# Patient Record
Sex: MALE | Race: OTHER | NOT HISPANIC OR LATINO
[De-identification: names, ages, dates, MRNs, and addresses within clinical notes are randomized per-mention and may not be internally consistent; named-entity substitution may affect disease eponyms.]

---

## 2017-01-03 ENCOUNTER — TRANSCRIPTION ENCOUNTER (OUTPATIENT)
Age: 47
End: 2017-01-03

## 2017-07-25 ENCOUNTER — TRANSCRIPTION ENCOUNTER (OUTPATIENT)
Age: 47
End: 2017-07-25

## 2017-11-27 ENCOUNTER — EMERGENCY (EMERGENCY)
Facility: HOSPITAL | Age: 47
LOS: 0 days | Discharge: HOME | End: 2017-11-27

## 2017-11-27 DIAGNOSIS — J44.9 CHRONIC OBSTRUCTIVE PULMONARY DISEASE, UNSPECIFIED: ICD-10-CM

## 2017-11-27 DIAGNOSIS — R10.13 EPIGASTRIC PAIN: ICD-10-CM

## 2017-11-27 DIAGNOSIS — R10.31 RIGHT LOWER QUADRANT PAIN: ICD-10-CM

## 2017-11-27 DIAGNOSIS — R11.0 NAUSEA: ICD-10-CM

## 2017-11-27 DIAGNOSIS — Z88.0 ALLERGY STATUS TO PENICILLIN: ICD-10-CM

## 2017-11-27 DIAGNOSIS — R19.7 DIARRHEA, UNSPECIFIED: ICD-10-CM

## 2017-11-27 DIAGNOSIS — R10.32 LEFT LOWER QUADRANT PAIN: ICD-10-CM

## 2017-11-27 DIAGNOSIS — Z87.891 PERSONAL HISTORY OF NICOTINE DEPENDENCE: ICD-10-CM

## 2017-11-27 DIAGNOSIS — E78.5 HYPERLIPIDEMIA, UNSPECIFIED: ICD-10-CM

## 2017-11-27 DIAGNOSIS — Z79.899 OTHER LONG TERM (CURRENT) DRUG THERAPY: ICD-10-CM

## 2017-12-08 ENCOUNTER — EMERGENCY (EMERGENCY)
Facility: HOSPITAL | Age: 47
LOS: 0 days | Discharge: HOME | End: 2017-12-08

## 2017-12-08 DIAGNOSIS — Z79.51 LONG TERM (CURRENT) USE OF INHALED STEROIDS: ICD-10-CM

## 2017-12-08 DIAGNOSIS — Z88.2 ALLERGY STATUS TO SULFONAMIDES: ICD-10-CM

## 2017-12-08 DIAGNOSIS — M54.2 CERVICALGIA: ICD-10-CM

## 2017-12-08 DIAGNOSIS — R05 COUGH: ICD-10-CM

## 2017-12-08 DIAGNOSIS — Z79.899 OTHER LONG TERM (CURRENT) DRUG THERAPY: ICD-10-CM

## 2017-12-08 DIAGNOSIS — Z88.0 ALLERGY STATUS TO PENICILLIN: ICD-10-CM

## 2017-12-08 DIAGNOSIS — R59.0 LOCALIZED ENLARGED LYMPH NODES: ICD-10-CM

## 2017-12-08 DIAGNOSIS — J44.9 CHRONIC OBSTRUCTIVE PULMONARY DISEASE, UNSPECIFIED: ICD-10-CM

## 2019-04-12 ENCOUNTER — TRANSCRIPTION ENCOUNTER (OUTPATIENT)
Age: 49
End: 2019-04-12

## 2020-10-12 ENCOUNTER — TRANSCRIPTION ENCOUNTER (OUTPATIENT)
Age: 50
End: 2020-10-12

## 2023-08-13 ENCOUNTER — NON-APPOINTMENT (OUTPATIENT)
Age: 53
End: 2023-08-13

## 2023-08-15 PROBLEM — Z00.00 ENCOUNTER FOR PREVENTIVE HEALTH EXAMINATION: Status: ACTIVE | Noted: 2023-08-15

## 2023-08-16 ENCOUNTER — APPOINTMENT (OUTPATIENT)
Dept: PSYCHIATRY | Facility: CLINIC | Age: 53
End: 2023-08-16

## 2023-08-16 ENCOUNTER — OUTPATIENT (OUTPATIENT)
Dept: OUTPATIENT SERVICES | Facility: HOSPITAL | Age: 53
LOS: 1 days | End: 2023-08-16
Payer: COMMERCIAL

## 2023-08-16 DIAGNOSIS — F43.10 POST-TRAUMATIC STRESS DISORDER, UNSPECIFIED: ICD-10-CM

## 2023-08-16 PROCEDURE — 90791 PSYCH DIAGNOSTIC EVALUATION: CPT

## 2023-08-17 DIAGNOSIS — F43.10 POST-TRAUMATIC STRESS DISORDER, UNSPECIFIED: ICD-10-CM

## 2023-08-18 NOTE — PHYSICAL EXAM
[Cooperative] : cooperative [Euthymic] : euthymic [Full] : full [Clear] : clear [Linear/Goal Directed] : linear/goal directed [None] : none [None Reported] : none reported [Average] : average [WNL] : within normal limits [Individual reports tobacco use during the last 30 days?] : Individual reports tobacco use during the last 30 days? Yes [Individual reports use of the following tobacco products during the last 30 days?] : Individual reports use of the following tobacco products during the last 30 days? Yes -  [Cigarettes] : Cigarettes [Was tobacco cessation medication and/or nicotine replacement therapy recommended?] : Was tobacco cessation medication and/or nicotine replacement therapy recommended? Yes [Does individual accept referral to MD for cessation medication or NRT?] : Does individual accept referral to MD for cessation medication or NRT? Yes [Individual reports current use of tobacco cessation medication or nicotine replacement therapy?] : Individual reports current use of tobacco cessation medication or nicotine replacement therapy? No

## 2023-08-18 NOTE — HEALTH RISK ASSESSMENT
[Patient/Caregiver unclear of wishes] : , patient/caregiver unclear of wishes [I will adhere to the patient's wishes.] : I will adhere to the patient's wishes. [Time Spent: ___ minutes] : Time Spent: [unfilled] minutes [With Patient/Collateral] : , with patient/collateral [AdvancecareDate] : 08/16/2023 [] : 08/16/2023
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
## 2023-08-18 NOTE — RISK ASSESSMENT
[Clinical Interview] : Clinical Interview [No known suicide factors] : No known suicide factors [Depressed mood/Anhedonia] : depressed mood/anhedonia [Global insomnia] : global insomnia [Severe anxiety, agitation or panic] : severe anxiety, agitation or panic [None known] : None known [Identifies reasons for living] : identifies reasons for living [Supportive social network of family or friends] : supportive social network of family or friends [Cultural, spiritual and/or moral attitudes against suicide] : cultural, spiritual and/or moral attitudes against suicide [Ability to cope with stress] : ability to cope with stress [Positive therapeutic relationships] : positive therapeutic relationships [Responsibility to children, family, or others] : responsibility to children, family, or others [None in the patient's lifetime] : None in the patient's lifetime [None Known] : none known [No known risk factors] : No known risk factors [Residential stability] : residential stability [Relationship stability] : relationship stability [Affective stability] : affective stability [Sobriety] : sobriety [Insight into violence risk and need for management/treatment] : insight into violence risk and need for management/treatment [Engagement in treatment] : engagement in treatment [No] : no [FreeTextEntry6] : The patient denied feeling as if he were a threat to himself

## 2023-08-18 NOTE — FAMILY HISTORY
[FreeTextEntry1] : Family composition: Children, father and grandchild  Family history and background:  male of  decent Family relationship: Patient notes that he has a good relationship with his family  Pertinent Family Medical, MH and Substance Use History including Adult Child of Alcoholic and child of substance abuse status; history of cancer and heart disease: Patient notes that he has been sober for 10 years from alcohol use disorder. Patient notes that he has a family history of heart disease. Mother passed away from a heart attack approximately 19 years ago.
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
## 2023-08-18 NOTE — SOCIAL HISTORY
[FreeTextEntry1] : Legal Status  Does individual Served have a Legal Guardian, rep Payee or Conservatorship? [ X] No [ ] Yes - Details: [ ]   Legal Involvement history  Does the individual have a history of or current involvement with the legal system?  [X ] No [ ] Yes - Details: [ ]    Services  Have you ever served in the ?  [ ] No [X ] Yes - Details: The patient was in the [employer]. Patient notes that he was [place]. Patient was in the  from [id number]. Patient is not connected to the [facility] and denies wanting services from the VA   Employment history: The patient was employed with the [employer] for 8 years and is currently retired and is not currently seeking out employment opportunities.   Developmental history: N/A   Sexual hx/identity Sexual History/ Concern (include sexual orientation and other relevant information): Heterosexual male utilizing he/him/his pronouns.   Race - ethnicity - culture information:  male of  decent [place] on [place]  Social supports (friends, Volunteers, club, AA meeting, other meetings ) ? "My friend that I live with is my social support and also my children"   Meaningful Activities: "I like to walk and exercise"   Spiritual Assessment Tool - FICA F. What is your aniket or belief? Episcopalian  Do you consider yourself spiritual or Rastafari? Worship  Is there something you believe in that gives meaning to your life? "I am thankful for everyday that I wake up" I: Is it important in your life? Yes  What influence does it have on how you take care of yourself? "No" How have your beliefs influenced your behavior during this illness? What role do your beliefs play in regaining your health? "None"  C. Are you part of a spiritual or Rastafari community? "No"  Is this of support to you and how? N/A  Is there a person or group of people you really love or who are really important to you? "No" H. We have been discussing your belief and supports. What else gives you internal support? "My family"  What are your sources of hope, strength, comfort and peace? " Hopefully for tomorrow and the hope in other people" What do you hold on to during difficult times? "My children"  what sustains you and keeps you going? "My children"  A. How would you like me, your healthcare provider, to address these issues in your healthcare? "Address with me"   SMOKING CESSATION  Do you Smoke?                              [X ] Yes		[ ] No Do you want to quit? 		[X ] Yes		[ ] No -ASK 	Number of cigarettes 5  [ ] cigars [ ]  pipe bowls [ ]  per day- **Patient notes that he enjoys lighting cigarettes as a form of "meditation" notes that he lights up approximately 1 pack per day.  	Number of ST cans/ pouches per week [1 ] 	Number of years used [40 years ] 	How soon after you wake up do you use tobacco?  [ X] within 30 minutes      [ ] more than 30 minutes  	Previous quit attempts:  # of attempts 1[ ] longest quit period [ 4-5 days] methods(s) used [ "I just stopped smoking"  ] How long ago was last attempt to quit  [ ] years [6 ] months  	Reasons for wanting to quit[ ] -ADVISE   about the oral benefits of quitting -ASSESS   willingness to make a quit attempt (Stage of Change)  	    [ ] Precontemplation (Stop here & Reassess next visit)	[ ] Contemplation [ ] Preparation   -ASSIST    (depending on stage of change)  	Self-Help Pamphlets & Materials 	List of local community group/individual quit programs and phone help lines 	Encourage a quit date (for those who are ready) 	Pharmacotherapy: Nicotine gum/ Lozenge/ Patch/ Inhaler/NS/Zyban/ Chantix  	RX: 	[ ]  ()  -ARRANGE  Follow up if set a quit date (with permission) Quit Date: [ ]  Phone calls or visits:  [ ] Week 1-2  Months   [ ] 1   [ ] 3   [ ] 6   [ ] 12   -Yearly Reassessment    [ ] No Changes of Smoking [ ] Change of Smoking Habit (Non-Smoker to Smoker/ Smoker to Non Smoker) (If there is change from Non Smoker to Smoker, please fill out new BRIEF TOBACCO CESSATION INTERVENTION FORM)  Date of Yearly Reassessment : [ ] Comments:  [ ]
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
## 2023-08-18 NOTE — PSYCHOSOCIAL ASSESSMENT
[Yes, during lifetime] : Yes, during lifetime [All substances negative except as specified below] : All substances negative except as specified below [_____] : Last Use: [unfilled]  [HS Diploma or GED] : HS Diploma or GED [Yes (select details below)] : Have you ever experienced this type of event? Yes [had nightmares about the event(s) or thought about the event(s) when you did not want] : had nightmares and/or unwanted thoughts about the events [tried hard not to think about the event(s) or went out of your way to avoid situations that reminded you of the event] : tried hard to avoid thinking about events or avoid situations that reminded patient of the event [has been constantly on guard, watchful, or easily startled] : has been constantly on guard, watchful, or easily startled [felt numb or detached from people, activities, or your surrounding] : has felt numb or detached from people, activities, or surroundings [felt guilty or unable to stop blaming yourself or others for the event(s) or any problems the event(s) may have caused] : has felt guilty or unable to stop blaming self or others for event(s), or any problems the event(s) may have caused [Not applicable] : not applicable [Retired] : retired [Financially stable] : financially stable [SSD] : SSD [Private residence (home, apartment, rooming house, hotel, motel, supported housing, supported Single Room Occupancy (SRO),] : Private residence (home, apartment, rooming house, hotel, motel, supported housing, supported Single Room Occupancy (SRO), permanent housing programs, transient housing programs, and shelter plus care housing) [Client's spouse or domestic partner] : client's spouse or domestic partner [Yes] : yes [N/A] : n/a [Spouse/Partner] : spouse/partner [Good] : good [Lives with Family Member] : lives with family member [Yes (add details)] : Prior or current active [place]  service? Yes [Yes - Relationship: ____] : Yes - [unfilled] [No] : Patient has personal representation (legal guardian, representative payee, conservatorship)? No [FreeTextEntry2] : Patient notes that he has a girlfriend that he would consider as his significant other, Christina.  [FreeTextEntry3] : The patient notes that he keeps to himself and functions well.  [FreeTextEntry1] : The patient identifies as a  cisgnder male and is a Oriental orthodox.  [FreeTextEntry7] : Christina Salmon [phone]

## 2023-08-30 ENCOUNTER — APPOINTMENT (OUTPATIENT)
Dept: PSYCHIATRY | Facility: CLINIC | Age: 53
End: 2023-08-30

## 2023-08-30 ENCOUNTER — NON-APPOINTMENT (OUTPATIENT)
Age: 53
End: 2023-08-30

## 2023-09-05 ENCOUNTER — APPOINTMENT (OUTPATIENT)
Dept: PSYCHIATRY | Facility: CLINIC | Age: 53
End: 2023-09-05
Payer: COMMERCIAL

## 2023-09-05 ENCOUNTER — OUTPATIENT (OUTPATIENT)
Dept: OUTPATIENT SERVICES | Facility: HOSPITAL | Age: 53
LOS: 1 days | End: 2023-09-05
Payer: COMMERCIAL

## 2023-09-05 ENCOUNTER — APPOINTMENT (OUTPATIENT)
Dept: PSYCHIATRY | Facility: CLINIC | Age: 53
End: 2023-09-05

## 2023-09-05 DIAGNOSIS — F43.10 POST-TRAUMATIC STRESS DISORDER, UNSPECIFIED: ICD-10-CM

## 2023-09-05 PROCEDURE — 90792 PSYCH DIAG EVAL W/MED SRVCS: CPT | Mod: 95

## 2023-09-05 PROCEDURE — 90792 PSYCH DIAG EVAL W/MED SRVCS: CPT

## 2023-09-06 ENCOUNTER — APPOINTMENT (OUTPATIENT)
Dept: PSYCHIATRY | Facility: CLINIC | Age: 53
End: 2023-09-06

## 2023-09-06 ENCOUNTER — OUTPATIENT (OUTPATIENT)
Dept: OUTPATIENT SERVICES | Facility: HOSPITAL | Age: 53
LOS: 1 days | End: 2023-09-06
Payer: COMMERCIAL

## 2023-09-06 DIAGNOSIS — F33.1 MAJOR DEPRESSIVE DISORDER, RECURRENT, MODERATE: ICD-10-CM

## 2023-09-06 DIAGNOSIS — F43.10 POST-TRAUMATIC STRESS DISORDER, UNSPECIFIED: ICD-10-CM

## 2023-09-06 PROCEDURE — 90834 PSYTX W PT 45 MINUTES: CPT
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
## 2023-09-06 NOTE — SOCIAL HISTORY
[FreeTextEntry1] : Legal Status  Does individual Served have a Legal Guardian, rep Payee or Conservatorship? [ X] No [ ] Yes - Details: [ ]   Legal Involvement history  Does the individual have a history of or current involvement with the legal system?  [X ] No [ ] Yes - Details: [ ]    Services  Have you ever served in the ?  [ ] No [X ] Yes - Details: The patient was in the [employer]. Patient notes that he was [place]. Patient was in the  from [id number]. Patient is not connected to the VA and denies wanting services from the VA   Employment history: The patient was employed with the [employer] for 8 years and is currently retired and is not currently seeking out employment opportunities.   Developmental history: N/A   Sexual hx/identity Sexual History/ Concern (include sexual orientation and other relevant information): Heterosexual male utilizing he/him/his pronouns.   Race - ethnicity - culture information:  male of  decent growing up on [place]  Social supports (friends, Volunteers, club, AA meeting, other meetings ) ? "My friend that I live with is my social support and also my children"   Meaningful Activities: "I like to walk and exercise"   Spiritual Assessment Tool - FICA F. What is your aniket or belief? Voodoo  Do you consider yourself spiritual or Oriental orthodox? Congregational  Is there something you believe in that gives meaning to your life? "I am thankful for everyday that I wake up" I: Is it important in your life? Yes  What influence does it have on how you take care of yourself? "No" How have your beliefs influenced your behavior during this illness? What role do your beliefs play in regaining your health? "None"  C. Are you part of a spiritual or Oriental orthodox community? "No"  Is this of support to you and how? N/A  Is there a person or group of people you really love or who are really important to you? "No" H. We have been discussing your belief and supports. What else gives you internal support? "My family"  What are your sources of hope, strength, comfort and peace? " Hopefully for tomorrow and the hope in other people" What do you hold on to during difficult times? "My children"  what sustains you and keeps you going? "My children"  A. How would you like me, your healthcare provider, to address these issues in your healthcare? "Address with me"   SMOKING CESSATION  Do you Smoke?                              [X ] Yes		[ ] No Do you want to quit? 		[X ] Yes		[ ] No -ASK 	Number of cigarettes 5  [ ] cigars [ ]  pipe bowls [ ]  per day- **Patient notes that he enjoys lighting cigarettes as a form of "meditation" notes that he lights up approximately 1 pack per day.  	Number of ST cans/ pouches per week [1 ] 	Number of years used [40 years ] 	How soon after you wake up do you use tobacco?  [ X] within 30 minutes      [ ] more than 30 minutes  	Previous quit attempts:  # of attempts 1[ ] longest quit period [ 4-5 days] methods(s) used [ "I just stopped smoking"  ] How long ago was last attempt to quit  [ ] years [6 ] months  	Reasons for wanting to quit[ ] -ADVISE   about the oral benefits of quitting -ASSESS   willingness to make a quit attempt (Stage of Change)  	    [ ] Precontemplation (Stop here & Reassess next visit)	[ ] Contemplation [ ] Preparation   -ASSIST    (depending on stage of change)  	Self-Help Pamphlets & Materials 	List of local community group/individual quit programs and phone help lines 	Encourage a quit date (for those who are ready) 	Pharmacotherapy: Nicotine gum/ Lozenge/ Patch/ Inhaler/NS/Zyban/ Chantix  	RX: 	[ ]  ()  -ARRANGE  Follow up if set a quit date (with permission) Quit Date: [ ]  Phone calls or visits:  [ ] Week 1-2  Months   [ ] 1   [ ] 3   [ ] 6   [ ] 12   -Yearly Reassessment    [ ] No Changes of Smoking [ ] Change of Smoking Habit (Non-Smoker to Smoker/ Smoker to Non Smoker) (If there is change from Non Smoker to Smoker, please fill out new BRIEF TOBACCO CESSATION INTERVENTION FORM)  Date of Yearly Reassessment : [ ] Comments:  [ ]
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
## 2023-09-06 NOTE — END OF VISIT
[Duration of Psychotherapy Visit (minutes spent in synchronous communication): ____] : Duration of Psychotherapy Visit (minutes spent in synchronous communication): [unfilled] [Individual Psychotherapy for 38-52 minutes] : Individual Psychotherapy for 38 - 52 minutes [FreeTextEntry3] : [address]  [FreeTextEntry5] : [address] [place]  [Licensed Clinician] : Licensed Clinician

## 2023-09-06 NOTE — REASON FOR VISIT
[FreeTextEntry4] : 1:30pm  [FreeTextEntry5] : 2:15pm  [Patient] : Patient [FreeTextEntry1] : psychotherapy follow up

## 2023-09-06 NOTE — HEALTH RISK ASSESSMENT
[Patient/Caregiver unclear of wishes] : , patient/caregiver unclear of wishes [I will adhere to the patient's wishes.] : I will adhere to the patient's wishes. [Time Spent: ___ minutes] : Time Spent: [unfilled] minutes [With Patient/Collateral] : , with patient/collateral [AdvancecareDate] : 08/16/2023
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
## 2023-09-06 NOTE — PSYCHOSOCIAL ASSESSMENT
[Yes, during lifetime] : Yes, during lifetime [All substances negative except as specified below] : All substances negative except as specified below [_____] : Last Use: [unfilled]  [HS Diploma or GED] : HS Diploma or GED [Yes (select details below)] : Have you ever experienced this type of event? Yes [had nightmares about the event(s) or thought about the event(s) when you did not want] : had nightmares and/or unwanted thoughts about the events [tried hard not to think about the event(s) or went out of your way to avoid situations that reminded you of the event] : tried hard to avoid thinking about events or avoid situations that reminded patient of the event [has been constantly on guard, watchful, or easily startled] : has been constantly on guard, watchful, or easily startled [felt numb or detached from people, activities, or your surrounding] : has felt numb or detached from people, activities, or surroundings [felt guilty or unable to stop blaming yourself or others for the event(s) or any problems the event(s) may have caused] : has felt guilty or unable to stop blaming self or others for event(s), or any problems the event(s) may have caused [Not applicable] : not applicable [Retired] : retired [Financially stable] : financially stable [SSD] : SSD [Private residence (home, apartment, rooming house, hotel, motel, supported housing, supported Single Room Occupancy (SRO),] : Private residence (home, apartment, rooming house, hotel, motel, supported housing, supported Single Room Occupancy (SRO), permanent housing programs, transient housing programs, and shelter plus care housing) [Client's spouse or domestic partner] : client's spouse or domestic partner [Yes] : yes [N/A] : n/a [Spouse/Partner] : spouse/partner [Good] : good [Lives with Family Member] : lives with family member [Yes (add details)] : Prior or current active [place]  service? Yes [Yes - Relationship: ____] : Yes - [unfilled] [No] : Patient has personal representation (legal guardian, representative payee, conservatorship)? No [FreeTextEntry2] : Patient notes that he has a girlfriend that he would consider as his significant other, Christina.  [FreeTextEntry3] : The patient notes that he keeps to himself and functions well.  [FreeTextEntry1] : The patient identifies as a  cisgnder male and is a Oriental orthodox.  [FreeTextEntry7] : Christina Salmon [phone]
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
## 2023-09-06 NOTE — END OF VISIT
[Duration of Psychotherapy Visit (minutes spent in synchronous communication): ____] : Duration of Psychotherapy Visit (minutes spent in synchronous communication): [unfilled] [Individual Psychotherapy for 38-52 minutes] : Individual Psychotherapy for 38 - 52 minutes [FreeTextEntry3] : [address]  [FreeTextEntry5] : [address]  [Licensed Clinician] : Licensed Clinician

## 2023-09-06 NOTE — PLAN
[FreeTextEntry4] : Pt appears to meet criteria for PTSD with symptoms of flashbacks, nightmares, hypervigilance. Will start Zoloft to aid with PTSD symptoms. Pt agrees with plan  PLAN 1) START Zoloft 25mg x weeks, then increase to 50mgqdaily 2) Continue therapy 3) Follow up in 1 month

## 2023-09-07 ENCOUNTER — APPOINTMENT (OUTPATIENT)
Dept: PSYCHIATRY | Facility: CLINIC | Age: 53
End: 2023-09-07

## 2023-09-07 ENCOUNTER — NON-APPOINTMENT (OUTPATIENT)
Age: 53
End: 2023-09-07

## 2023-09-07 ENCOUNTER — OUTPATIENT (OUTPATIENT)
Dept: OUTPATIENT SERVICES | Facility: HOSPITAL | Age: 53
LOS: 1 days | End: 2023-09-07

## 2023-09-07 DIAGNOSIS — F43.10 POST-TRAUMATIC STRESS DISORDER, UNSPECIFIED: ICD-10-CM

## 2023-09-07 DIAGNOSIS — F33.1 MAJOR DEPRESSIVE DISORDER, RECURRENT, MODERATE: ICD-10-CM

## 2023-09-07 NOTE — PHYSICAL EXAM
[Individual reports tobacco use during the last 30 days?] : Individual reports tobacco use during the last 30 days? Yes [Individual reports use of the following tobacco products during the last 30 days?] : Individual reports use of the following tobacco products during the last 30 days? Yes -  [Cigarettes] : Cigarettes [Was tobacco cessation medication and/or nicotine replacement therapy recommended?] : Was tobacco cessation medication and/or nicotine replacement therapy recommended? Yes [Does individual accept referral to MD for cessation medication or NRT?] : Does individual accept referral to MD for cessation medication or NRT? Yes [Individual reports current use of tobacco cessation medication or nicotine replacement therapy?] : Individual reports current use of tobacco cessation medication or nicotine replacement therapy? No

## 2023-09-07 NOTE — DISCUSSION/SUMMARY
[Initial Plan] : Initial Plan [Mental Health] : Mental Health [Able to manage surrounding demands and opportunities] : able to manage surrounding demands and opportunities [Able to exercise self-direction] : able to exercise self-direction [Able to set and pursue goals] : able to set and pursue goals [Adherent to treatment recommendations] : adherent to treatment recommendations [Articulate] : articulate [Attempting to realize their potential] : Attempting to realize their potential [Cognitively intact] : cognitively intact [Insightful] : insightful [Creative] : creative [Intelligent] : intelligent [Motivated to participate in treatment] : motivated to participate in treatment [Motivated and ready for change] : motivated and ready for change [Health literacy] : health literacy [Motivated to maintain or improve physical health] : motivated to maintain or improve physical health [In good health] : in good health [Financially stable] : financially stable [Has vocational interests or hobbies] : has vocational interests or hobbies [Part of a supportive family] : part of a supportive family [Housing stability] : housing stability [English fluency] : English fluency [Connected to healthcare] : connected to healthcare [Access to safe outdoor spaces] : access to safe outdoor spaces [FreeTextEntry2] : 09/06/2024 [FreeTextEntry3] : 09/05/2023 [FreeTextEntry8] : N/A  [FreeTextEntry9] : N/A [de-identified] : N/A [Physical Health] : Physical Health [Initial] : Initial [every ___ months] : every [unfilled] months [every ___ weeks] : every [unfilled] weeks [Improvement in symptoms as evidenced by:] : Improvement in symptoms as evidenced by: [Attainment of higher level of functioning as evidenced by:] : Attainment of higher level of functioning as evidenced by: [None - Reason others did not participate:] : None - Reason others did not participate:  [Yes] : Yes [Psychiatric Provider/Prescriber] : Psychiatric Provider/Prescriber [Therapist] : Therapist [Supervisor (if needed)] : Supervisor [FreeTextEntry1] : Management of PTSD related symptoms  [de-identified] : The patient will attend scheduled medication management appointments and will adhere to psychotropic medication as prescribed  [de-identified] : 9/6/2024  [FreeTextEntry5] : The writer will utilize CBT, DBT and supportive therapy  [FreeTextEntry4] : "I want to continue on with the treatment that I have had prior to this with my previous therapist"  [de-identified] : The patient will attend scheduled medical appointments and will adhere to prescribed medications  [de-identified] : 9/6/2024 [de-identified] : The patient is seen by Dr. Tomasa Melo  [de-identified] : The patient is seen by Shyanne Ortiz LMSW [de-identified] :  The patient expresses improvement in performing activities of daily living and/or says progress with initial complaint symptoms. In addition, the treatment team will conduct diagnose-based screenings as needed. [de-identified] : The patient expresses improvement in performing activities of daily living and/or says progress with initial complaint symptoms. In addition, the treatment team will conduct diagnose-based screenings as needed. [de-identified] : not clinically indicated  [Tobacco Screening Completed?] : Tobacco Screening Completed: Yes [Potential impact of patient’s physical health conditions on psychiatric care?] : Potential impact of patient’s physical health conditions on psychiatric care: Yes [Does patient require any additional health services or referrals?] : Does patient require any additional health services or referrals: No

## 2023-09-07 NOTE — DISCUSSION/SUMMARY
[Initial Plan] : Initial Plan [Mental Health] : Mental Health [Able to manage surrounding demands and opportunities] : able to manage surrounding demands and opportunities [Able to exercise self-direction] : able to exercise self-direction [Able to set and pursue goals] : able to set and pursue goals [Adherent to treatment recommendations] : adherent to treatment recommendations [Articulate] : articulate [Attempting to realize their potential] : Attempting to realize their potential [Cognitively intact] : cognitively intact [Insightful] : insightful [Creative] : creative [Intelligent] : intelligent [Motivated to participate in treatment] : motivated to participate in treatment [Motivated and ready for change] : motivated and ready for change [Health literacy] : health literacy [Motivated to maintain or improve physical health] : motivated to maintain or improve physical health [In good health] : in good health [Financially stable] : financially stable [Has vocational interests or hobbies] : has vocational interests or hobbies [Part of a supportive family] : part of a supportive family [Housing stability] : housing stability [English fluency] : English fluency [Connected to healthcare] : connected to healthcare [Access to safe outdoor spaces] : access to safe outdoor spaces [FreeTextEntry2] : 09/06/2024 [FreeTextEntry3] : 09/05/2023 [FreeTextEntry8] : N/A  [FreeTextEntry9] : N/A [de-identified] : N/A [Physical Health] : Physical Health [Initial] : Initial [every ___ months] : every [unfilled] months [every ___ weeks] : every [unfilled] weeks [Improvement in symptoms as evidenced by:] : Improvement in symptoms as evidenced by: [Attainment of higher level of functioning as evidenced by:] : Attainment of higher level of functioning as evidenced by: [None - Reason others did not participate:] : None - Reason others did not participate:  [Yes] : Yes [Psychiatric Provider/Prescriber] : Psychiatric Provider/Prescriber [Therapist] : Therapist [Supervisor (if needed)] : Supervisor [FreeTextEntry1] : Management of PTSD related symptoms  [de-identified] : The patient will attend scheduled medication management appointments and will adhere to psychotropic medication as prescribed  [de-identified] : 9/6/2024  [FreeTextEntry5] : The writer will utilize CBT, DBT and supportive therapy  [FreeTextEntry4] : "I want to continue on with the treatment that I have had prior to this with my previous therapist"  [de-identified] : The patient will attend scheduled medical appointments and will adhere to prescribed medications  [de-identified] : 9/6/2024 [de-identified] : The patient is seen by Dr. Tomasa Melo  [de-identified] : The patient is seen by Shyanne Ortiz LMSW [de-identified] :  The patient expresses improvement in performing activities of daily living and/or says progress with initial complaint symptoms. In addition, the treatment team will conduct diagnose-based screenings as needed. [de-identified] : The patient expresses improvement in performing activities of daily living and/or says progress with initial complaint symptoms. In addition, the treatment team will conduct diagnose-based screenings as needed. [de-identified] : not clinically indicated  [Tobacco Screening Completed?] : Tobacco Screening Completed: Yes [Potential impact of patient’s physical health conditions on psychiatric care?] : Potential impact of patient’s physical health conditions on psychiatric care: Yes [Does patient require any additional health services or referrals?] : Does patient require any additional health services or referrals: No

## 2023-09-08 DIAGNOSIS — F43.10 POST-TRAUMATIC STRESS DISORDER, UNSPECIFIED: ICD-10-CM

## 2023-09-13 ENCOUNTER — APPOINTMENT (OUTPATIENT)
Dept: PSYCHIATRY | Facility: CLINIC | Age: 53
End: 2023-09-13

## 2023-09-13 ENCOUNTER — OUTPATIENT (OUTPATIENT)
Dept: OUTPATIENT SERVICES | Facility: HOSPITAL | Age: 53
LOS: 1 days | End: 2023-09-13
Payer: COMMERCIAL

## 2023-09-13 VITALS
RESPIRATION RATE: 18 BRPM | WEIGHT: 178 LBS | TEMPERATURE: 98.6 F | BODY MASS INDEX: 25.48 KG/M2 | HEIGHT: 70 IN | HEART RATE: 80 BPM | SYSTOLIC BLOOD PRESSURE: 118 MMHG | DIASTOLIC BLOOD PRESSURE: 76 MMHG

## 2023-09-13 DIAGNOSIS — F33.1 MAJOR DEPRESSIVE DISORDER, RECURRENT, MODERATE: ICD-10-CM

## 2023-09-13 DIAGNOSIS — Z86.39 PERSONAL HISTORY OF OTHER ENDOCRINE, NUTRITIONAL AND METABOLIC DISEASE: ICD-10-CM

## 2023-09-13 PROCEDURE — 99401 PREV MED CNSL INDIV APPRX 15: CPT

## 2023-09-13 PROCEDURE — 90834 PSYTX W PT 45 MINUTES: CPT | Mod: 27

## 2023-09-14 DIAGNOSIS — F33.1 MAJOR DEPRESSIVE DISORDER, RECURRENT, MODERATE: ICD-10-CM

## 2023-09-27 ENCOUNTER — APPOINTMENT (OUTPATIENT)
Dept: PSYCHIATRY | Facility: CLINIC | Age: 53
End: 2023-09-27

## 2023-09-27 ENCOUNTER — OUTPATIENT (OUTPATIENT)
Dept: OUTPATIENT SERVICES | Facility: HOSPITAL | Age: 53
LOS: 1 days | End: 2023-09-27

## 2023-09-27 DIAGNOSIS — F33.1 MAJOR DEPRESSIVE DISORDER, RECURRENT, MODERATE: ICD-10-CM

## 2023-09-28 DIAGNOSIS — F33.1 MAJOR DEPRESSIVE DISORDER, RECURRENT, MODERATE: ICD-10-CM

## 2023-10-03 ENCOUNTER — APPOINTMENT (OUTPATIENT)
Dept: PSYCHIATRY | Facility: CLINIC | Age: 53
End: 2023-10-03
Payer: COMMERCIAL

## 2023-10-03 ENCOUNTER — OUTPATIENT (OUTPATIENT)
Dept: OUTPATIENT SERVICES | Facility: HOSPITAL | Age: 53
LOS: 1 days | End: 2023-10-03
Payer: COMMERCIAL

## 2023-10-03 DIAGNOSIS — F43.10 POST-TRAUMATIC STRESS DISORDER, UNSPECIFIED: ICD-10-CM

## 2023-10-03 PROCEDURE — 99214 OFFICE O/P EST MOD 30 MIN: CPT | Mod: 95

## 2023-10-04 ENCOUNTER — OUTPATIENT (OUTPATIENT)
Dept: OUTPATIENT SERVICES | Facility: HOSPITAL | Age: 53
LOS: 1 days | End: 2023-10-04
Payer: COMMERCIAL

## 2023-10-04 ENCOUNTER — APPOINTMENT (OUTPATIENT)
Dept: PSYCHIATRY | Facility: CLINIC | Age: 53
End: 2023-10-04

## 2023-10-04 DIAGNOSIS — F43.10 POST-TRAUMATIC STRESS DISORDER, UNSPECIFIED: ICD-10-CM

## 2023-10-04 DIAGNOSIS — F33.1 MAJOR DEPRESSIVE DISORDER, RECURRENT, MODERATE: ICD-10-CM

## 2023-10-04 PROCEDURE — 90832 PSYTX W PT 30 MINUTES: CPT

## 2023-10-05 DIAGNOSIS — F43.10 POST-TRAUMATIC STRESS DISORDER, UNSPECIFIED: ICD-10-CM

## 2023-10-11 ENCOUNTER — OUTPATIENT (OUTPATIENT)
Dept: OUTPATIENT SERVICES | Facility: HOSPITAL | Age: 53
LOS: 1 days | End: 2023-10-11
Payer: COMMERCIAL

## 2023-10-11 ENCOUNTER — APPOINTMENT (OUTPATIENT)
Dept: PSYCHIATRY | Facility: CLINIC | Age: 53
End: 2023-10-11

## 2023-10-11 DIAGNOSIS — F33.1 MAJOR DEPRESSIVE DISORDER, RECURRENT, MODERATE: ICD-10-CM

## 2023-10-11 DIAGNOSIS — F43.10 POST-TRAUMATIC STRESS DISORDER, UNSPECIFIED: ICD-10-CM

## 2023-10-11 PROCEDURE — 90832 PSYTX W PT 30 MINUTES: CPT

## 2023-10-12 DIAGNOSIS — F43.10 POST-TRAUMATIC STRESS DISORDER, UNSPECIFIED: ICD-10-CM

## 2023-10-18 ENCOUNTER — APPOINTMENT (OUTPATIENT)
Dept: PSYCHIATRY | Facility: CLINIC | Age: 53
End: 2023-10-18

## 2023-10-18 ENCOUNTER — OUTPATIENT (OUTPATIENT)
Dept: OUTPATIENT SERVICES | Facility: HOSPITAL | Age: 53
LOS: 1 days | End: 2023-10-18
Payer: COMMERCIAL

## 2023-10-18 DIAGNOSIS — F43.10 POST-TRAUMATIC STRESS DISORDER, UNSPECIFIED: ICD-10-CM

## 2023-10-18 DIAGNOSIS — F33.1 MAJOR DEPRESSIVE DISORDER, RECURRENT, MODERATE: ICD-10-CM

## 2023-10-18 PROCEDURE — 90832 PSYTX W PT 30 MINUTES: CPT

## 2023-10-19 DIAGNOSIS — F43.10 POST-TRAUMATIC STRESS DISORDER, UNSPECIFIED: ICD-10-CM

## 2023-10-23 ENCOUNTER — NON-APPOINTMENT (OUTPATIENT)
Age: 53
End: 2023-10-23

## 2023-11-07 ENCOUNTER — APPOINTMENT (OUTPATIENT)
Dept: PSYCHIATRY | Facility: CLINIC | Age: 53
End: 2023-11-07
Payer: COMMERCIAL

## 2023-11-07 ENCOUNTER — OUTPATIENT (OUTPATIENT)
Dept: OUTPATIENT SERVICES | Facility: HOSPITAL | Age: 53
LOS: 1 days | End: 2023-11-07
Payer: COMMERCIAL

## 2023-11-07 DIAGNOSIS — F43.10 POST-TRAUMATIC STRESS DISORDER, UNSPECIFIED: ICD-10-CM

## 2023-11-07 PROCEDURE — 99214 OFFICE O/P EST MOD 30 MIN: CPT | Mod: 95

## 2023-11-08 ENCOUNTER — APPOINTMENT (OUTPATIENT)
Dept: PSYCHIATRY | Facility: CLINIC | Age: 53
End: 2023-11-08

## 2023-11-08 ENCOUNTER — OUTPATIENT (OUTPATIENT)
Dept: OUTPATIENT SERVICES | Facility: HOSPITAL | Age: 53
LOS: 1 days | End: 2023-11-08
Payer: COMMERCIAL

## 2023-11-08 DIAGNOSIS — F43.10 POST-TRAUMATIC STRESS DISORDER, UNSPECIFIED: ICD-10-CM

## 2023-11-08 DIAGNOSIS — F33.1 MAJOR DEPRESSIVE DISORDER, RECURRENT, MODERATE: ICD-10-CM

## 2023-11-08 PROCEDURE — 90834 PSYTX W PT 45 MINUTES: CPT

## 2023-11-09 DIAGNOSIS — F33.1 MAJOR DEPRESSIVE DISORDER, RECURRENT, MODERATE: ICD-10-CM

## 2023-11-22 ENCOUNTER — APPOINTMENT (OUTPATIENT)
Dept: PSYCHIATRY | Facility: CLINIC | Age: 53
End: 2023-11-22

## 2023-11-22 ENCOUNTER — OUTPATIENT (OUTPATIENT)
Dept: OUTPATIENT SERVICES | Facility: HOSPITAL | Age: 53
LOS: 1 days | End: 2023-11-22
Payer: COMMERCIAL

## 2023-11-22 DIAGNOSIS — F43.10 POST-TRAUMATIC STRESS DISORDER, UNSPECIFIED: ICD-10-CM

## 2023-11-22 DIAGNOSIS — F33.1 MAJOR DEPRESSIVE DISORDER, RECURRENT, MODERATE: ICD-10-CM

## 2023-11-22 PROCEDURE — 90832 PSYTX W PT 30 MINUTES: CPT

## 2023-11-23 DIAGNOSIS — F43.10 POST-TRAUMATIC STRESS DISORDER, UNSPECIFIED: ICD-10-CM

## 2023-12-05 ENCOUNTER — APPOINTMENT (OUTPATIENT)
Dept: PSYCHIATRY | Facility: CLINIC | Age: 53
End: 2023-12-05

## 2023-12-06 ENCOUNTER — APPOINTMENT (OUTPATIENT)
Dept: PSYCHIATRY | Facility: CLINIC | Age: 53
End: 2023-12-06

## 2023-12-07 ENCOUNTER — APPOINTMENT (OUTPATIENT)
Dept: PSYCHIATRY | Facility: CLINIC | Age: 53
End: 2023-12-07
Payer: COMMERCIAL

## 2023-12-07 ENCOUNTER — OUTPATIENT (OUTPATIENT)
Dept: OUTPATIENT SERVICES | Facility: HOSPITAL | Age: 53
LOS: 1 days | End: 2023-12-07
Payer: COMMERCIAL

## 2023-12-07 DIAGNOSIS — F43.10 POST-TRAUMATIC STRESS DISORDER, UNSPECIFIED: ICD-10-CM

## 2023-12-07 PROCEDURE — 99213 OFFICE O/P EST LOW 20 MIN: CPT | Mod: 95

## 2023-12-08 DIAGNOSIS — F43.10 POST-TRAUMATIC STRESS DISORDER, UNSPECIFIED: ICD-10-CM

## 2023-12-13 ENCOUNTER — APPOINTMENT (OUTPATIENT)
Dept: PSYCHIATRY | Facility: CLINIC | Age: 53
End: 2023-12-13

## 2023-12-13 ENCOUNTER — OUTPATIENT (OUTPATIENT)
Dept: OUTPATIENT SERVICES | Facility: HOSPITAL | Age: 53
LOS: 1 days | End: 2023-12-13
Payer: COMMERCIAL

## 2023-12-13 DIAGNOSIS — F43.10 POST-TRAUMATIC STRESS DISORDER, UNSPECIFIED: ICD-10-CM

## 2023-12-13 PROCEDURE — 90832 PSYTX W PT 30 MINUTES: CPT

## 2023-12-13 NOTE — END OF VISIT
[Duration of Psychotherapy Visit (minutes spent in synchronous communication): ____] : Duration of Psychotherapy Visit (minutes spent in synchronous communication): [unfilled] [Individual Psychotherapy for 16-37 minutes] : Individual Psychotherapy for 16-37 minutes [FreeTextEntry3] : [address]  [FreeTextEntry5] : [address] [place]  [Licensed Clinician] : Licensed Clinician

## 2023-12-13 NOTE — PLAN
[FreeTextEntry2] : Goal #1- Management of PTSD related symptoms  Obj #1- The patient will verbalize three triggers of PTSD symptoms and will utilize three healthy coping mechanisms in the form of utilizing mindfulness activities in three instances of an increase in symptoms Obj #2- The patient will attend scheduled medication management appointments and will adhere to psychotropic medication as prescribed  Goal #2- Health maintenance Obj #1- The patient will attend scheduled medical appointments and will adhere to prescribed medications [Cognitive and/or Behavior Therapy] : Cognitive and/or Behavior Therapy  [Dialectical Behavior Therapy] : Dialectical Behavior Therapy  [Psychoeducation] : Psychoeducation  [Skills training (all types)] : Skills training (all types)  [Supportive Therapy] : Supportive Therapy [Trauma Focused CBT] : Trauma Focused CBT [de-identified] : The patient attended his scheduled session with the writer in person. The patient noted that he received word that his son who is currently serving in the  will not be coming home for [place] this year. The writer explored this with the patient further and he noted that he has accepted this and he coming to terms with this reality. The patient explained that his partner is taking the news harder. The patient and the writer discussed ways that the patient can provide emotional support to his partner. The patient explained that he will be treating [place] as "any other day". The patient explained that he has not been feeling a depressed and anxious noting "I have been okay".  The writer and the patient discussed smoking cessation and he noted that he recently had a dream that he passed away from complications related to smoking. The patient explained that he has noticed that despite living a relatively healthy life and going to the gym, he noted that he has noticed that his stamina is greatly reduced. Patient explained that he is aware that he knows that he needs to stop smoking for his overall health. The writer and the patient discussed the stages of change and where he falls on this spectrum. This led to a discussion regarding the use of psychotropic medications to aide in the cessation of nicotine cravings. Patient was strongly encouraged to discuss this with his psychiatrist. The patient reports medication adherence. The writer will continue to provide the patient with support and encouragement where needed. The patient is scheduled for a follow appointment on 1/3/2024 at 11:30am in person per the patient's request.  [Recommended Frequency of Visits: ____] : Recommended frequency of visits: [unfilled] [Return in ____ week(s)] : Return in [unfilled] week(s)

## 2023-12-13 NOTE — REASON FOR VISIT
[FreeTextEntry4] : 12pm  [FreeTextEntry5] : 12:30pm  [Patient] : Patient [FreeTextEntry1] : psychotherapy follow up

## 2023-12-13 NOTE — PHYSICAL EXAM
[Individual reports tobacco use during the last 30 days?] : Individual reports tobacco use during the last 30 days? Yes [Individual reports use of the following tobacco products during the last 30 days?] : Individual reports use of the following tobacco products during the last 30 days? Yes -  [Cigarettes] : Cigarettes [Individual reports current use of tobacco cessation medication or nicotine replacement therapy?] : Individual reports current use of tobacco cessation medication or nicotine replacement therapy? No [Was tobacco cessation medication and/or nicotine replacement therapy recommended?] : Was tobacco cessation medication and/or nicotine replacement therapy recommended? Yes [Does individual accept referral to MD for cessation medication or NRT?] : Does individual accept referral to MD for cessation medication or NRT? Yes

## 2023-12-14 DIAGNOSIS — F43.10 POST-TRAUMATIC STRESS DISORDER, UNSPECIFIED: ICD-10-CM

## 2024-01-03 ENCOUNTER — APPOINTMENT (OUTPATIENT)
Dept: PSYCHIATRY | Facility: CLINIC | Age: 54
End: 2024-01-03

## 2024-01-09 ENCOUNTER — NON-APPOINTMENT (OUTPATIENT)
Age: 54
End: 2024-01-09

## 2024-01-09 NOTE — DISCUSSION/SUMMARY
[FreeTextEntry1] : The writer called the patient to inquire about returning to therapy. The patient is scheduled for a follow up appointment on 1/1/2024 at 12pm in person per the patient's request.

## 2024-01-26 ENCOUNTER — APPOINTMENT (OUTPATIENT)
Dept: PSYCHIATRY | Facility: CLINIC | Age: 54
End: 2024-01-26

## 2024-01-26 ENCOUNTER — TRANSCRIPTION ENCOUNTER (OUTPATIENT)
Age: 54
End: 2024-01-26

## 2024-01-26 ENCOUNTER — OUTPATIENT (OUTPATIENT)
Dept: OUTPATIENT SERVICES | Facility: HOSPITAL | Age: 54
LOS: 1 days | End: 2024-01-26
Payer: COMMERCIAL

## 2024-01-26 DIAGNOSIS — F43.10 POST-TRAUMATIC STRESS DISORDER, UNSPECIFIED: ICD-10-CM

## 2024-01-26 PROCEDURE — 90832 PSYTX W PT 30 MINUTES: CPT

## 2024-01-26 NOTE — REASON FOR VISIT
[FreeTextEntry4] : 10:30am  [FreeTextEntry5] : 11am  [FreeTextEntry1] : psychotherapy follow up  [Patient] : Patient

## 2024-01-26 NOTE — PLAN
[FreeTextEntry2] : Goal #1- Management of PTSD related symptoms  Obj #1- The patient will verbalize three triggers of PTSD symptoms and will utilize three healthy coping mechanisms in the form of utilizing mindfulness activities in three instances of an increase in symptoms Obj #2- The patient will attend scheduled medication management appointments and will adhere to psychotropic medication as prescribed  Goal #2- Health maintenance Obj #1- The patient will attend scheduled medical appointments and will adhere to prescribed medications [Cognitive and/or Behavior Therapy] : Cognitive and/or Behavior Therapy  [Dialectical Behavior Therapy] : Dialectical Behavior Therapy  [Psychoeducation] : Psychoeducation  [Skills training (all types)] : Skills training (all types)  [Supportive Therapy] : Supportive Therapy [Trauma Focused CBT] : Trauma Focused CBT [de-identified] : The patient attended his scheduled session with the writer in person. The patient reported that since the Holiday season he has been feeling "off". The writer explored this, and he noted that he has been having periods of seasonal depression as "I need the sun". Patient explained that he has been having issues staying asleep despite taking his prescribed trazadone. Patient reported that his daughter has been diagnosed with thyroid cancer. The patient explained that this diagnosis has been difficult for him however, he is remaining positive and optimistic about the results. Patient reported that he has been trying to keep himself busy in his workshop however, he has not been as motivated as he once was a few weeks prior. The patient noted that he has multiple [facility] health monitoring appointments including a colonoscopy, endoscopy and follow up blood work. Patient vocalized frustration with [facility] due to a lack of follow up. This led to a discussion regarding self-advocacy. The patient noted that he will be busy with his daughter as she has upcoming surgery on Monday. The patient reports medication adherence. The writer will continue to provide the patient with support and encouragement where needed. The patient is scheduled for a follow up appointment on 2/9/2024 at 10:30am in person per the patient's request.  [Recommended Frequency of Visits: ____] : Recommended frequency of visits: [unfilled] [Return in ____ week(s)] : Return in [unfilled] week(s)

## 2024-01-26 NOTE — END OF VISIT
[Duration of Psychotherapy Visit (minutes spent in synchronous communication): ____] : Duration of Psychotherapy Visit (minutes spent in synchronous communication): [unfilled] [Individual Psychotherapy for 16-37 minutes] : Individual Psychotherapy for 16-37 minutes [FreeTextEntry3] : [address]  [FreeTextEntry5] : [address]  [Licensed Clinician] : Licensed Clinician

## 2024-01-27 DIAGNOSIS — F43.10 POST-TRAUMATIC STRESS DISORDER, UNSPECIFIED: ICD-10-CM

## 2024-02-01 ENCOUNTER — OUTPATIENT (OUTPATIENT)
Dept: OUTPATIENT SERVICES | Facility: HOSPITAL | Age: 54
LOS: 1 days | End: 2024-02-01
Payer: COMMERCIAL

## 2024-02-01 ENCOUNTER — APPOINTMENT (OUTPATIENT)
Dept: PSYCHIATRY | Facility: CLINIC | Age: 54
End: 2024-02-01
Payer: COMMERCIAL

## 2024-02-01 DIAGNOSIS — F43.10 POST-TRAUMATIC STRESS DISORDER, UNSPECIFIED: ICD-10-CM

## 2024-02-01 PROCEDURE — 99213 OFFICE O/P EST LOW 20 MIN: CPT | Mod: 95

## 2024-02-01 NOTE — PLAN
[FreeTextEntry5] : Will continue current med regimen due to ongoing benefit. Pt was offered increase in Trazodone however pt declined, pt agreed to inform provider if sleep issues persist or worsen.   PLAN 1) Continue Zoloft 75mg qd for PTSD 2) Continue Trazodone  100mg qhs PRN for insomnia 3) follow up in 2 month

## 2024-02-01 NOTE — HISTORY OF PRESENT ILLNESS
[FreeTextEntry1] :  Pt reports some situational anxiety due to daughter's recent cancer dx. Pt reports that due to this his sleep has worsened however pt reports he is reluctant to increase his Trazodone medication.   Pt reports good appetite, denies any active or passive Si/HI/AVH at this time. Pt likes to exercise, lifting weights. Pt reports ongoing benefit from therapy.

## 2024-02-02 DIAGNOSIS — F43.10 POST-TRAUMATIC STRESS DISORDER, UNSPECIFIED: ICD-10-CM

## 2024-02-09 ENCOUNTER — OUTPATIENT (OUTPATIENT)
Dept: OUTPATIENT SERVICES | Facility: HOSPITAL | Age: 54
LOS: 1 days | End: 2024-02-09
Payer: COMMERCIAL

## 2024-02-09 ENCOUNTER — APPOINTMENT (OUTPATIENT)
Dept: PSYCHIATRY | Facility: CLINIC | Age: 54
End: 2024-02-09

## 2024-02-09 DIAGNOSIS — F43.10 POST-TRAUMATIC STRESS DISORDER, UNSPECIFIED: ICD-10-CM

## 2024-02-09 PROCEDURE — 90832 PSYTX W PT 30 MINUTES: CPT

## 2024-02-09 NOTE — END OF VISIT
[Duration of Psychotherapy Visit (minutes spent in synchronous communication): ____] : Duration of Psychotherapy Visit (minutes spent in synchronous communication): [unfilled] [Individual Psychotherapy for 16-37 minutes] : Individual Psychotherapy for 16-37 minutes [FreeTextEntry3] : [address]  [FreeTextEntry5] : [address]  [Licensed Clinician] : Licensed Clinician

## 2024-02-09 NOTE — PLAN
[Cognitive and/or Behavior Therapy] : Cognitive and/or Behavior Therapy  [Dialectical Behavior Therapy] : Dialectical Behavior Therapy  [Psychoeducation] : Psychoeducation  [Skills training (all types)] : Skills training (all types)  [Supportive Therapy] : Supportive Therapy [Trauma Focused CBT] : Trauma Focused CBT [FreeTextEntry2] : Goal #1- Management of PTSD related symptoms  Obj #1- The patient will verbalize three triggers of PTSD symptoms and will utilize three healthy coping mechanisms in the form of utilizing mindfulness activities in three instances of an increase in symptoms Obj #2- The patient will attend scheduled medication management appointments and will adhere to psychotropic medication as prescribed  Goal #2- Health maintenance Obj #1- The patient will attend scheduled medical appointments and will adhere to prescribed medications [de-identified] : The patient attended his scheduled session with the writer in person. The patient noted that today "I am having a good day". The patient reported that he has been keeping himself busy with home improvements projects. The patient is looking forward to the Spring where he plans to spend the majority of his days outside as he does not enjoy being in the home. Patient reported that he has an appointment in [place] to potentially obtain "[employer]" state insurance. Patient noted that he is looking forward to this as he was previously taking cholesterol medication however, he has not been on the medication for the last 2 years due to lack of health insurance and a primary care doctor.  Patient explained that the results from his last endoscopy was within normal range, and he is scheduled for a colonoscopy. The patient reports medication adherence. The writer will continue to provide the patient with support and encouragement where needed. The patient is scheduled for a follow up appointment on 2/23/2024 at 10:30am on person.  [Recommended Frequency of Visits: ____] : Recommended frequency of visits: [unfilled] [Return in ____ week(s)] : Return in [unfilled] week(s)

## 2024-02-09 NOTE — REASON FOR VISIT
[Patient] : Patient [FreeTextEntry4] : 10:30am  [FreeTextEntry5] : 11am  [FreeTextEntry1] : psychotherapy follow up

## 2024-02-10 DIAGNOSIS — F43.10 POST-TRAUMATIC STRESS DISORDER, UNSPECIFIED: ICD-10-CM

## 2024-02-23 ENCOUNTER — APPOINTMENT (OUTPATIENT)
Dept: PSYCHIATRY | Facility: CLINIC | Age: 54
End: 2024-02-23

## 2024-02-23 ENCOUNTER — EMERGENCY (EMERGENCY)
Facility: HOSPITAL | Age: 54
LOS: 0 days | Discharge: HOME | End: 2024-02-23
Attending: EMERGENCY MEDICINE
Payer: COMMERCIAL

## 2024-02-23 VITALS
RESPIRATION RATE: 18 BRPM | SYSTOLIC BLOOD PRESSURE: 123 MMHG | TEMPERATURE: 98 F | OXYGEN SATURATION: 98 % | DIASTOLIC BLOOD PRESSURE: 70 MMHG | HEART RATE: 73 BPM

## 2024-02-23 VITALS
RESPIRATION RATE: 18 BRPM | WEIGHT: 179.9 LBS | HEIGHT: 70 IN | TEMPERATURE: 98 F | OXYGEN SATURATION: 98 % | HEART RATE: 85 BPM | DIASTOLIC BLOOD PRESSURE: 82 MMHG | SYSTOLIC BLOOD PRESSURE: 131 MMHG

## 2024-02-23 DIAGNOSIS — G47.33 OBSTRUCTIVE SLEEP APNEA (ADULT) (PEDIATRIC): ICD-10-CM

## 2024-02-23 DIAGNOSIS — E78.5 HYPERLIPIDEMIA, UNSPECIFIED: ICD-10-CM

## 2024-02-23 DIAGNOSIS — Z53.29 PROCEDURE AND TREATMENT NOT CARRIED OUT BECAUSE OF PATIENT'S DECISION FOR OTHER REASONS: ICD-10-CM

## 2024-02-23 DIAGNOSIS — Z99.89 DEPENDENCE ON OTHER ENABLING MACHINES AND DEVICES: ICD-10-CM

## 2024-02-23 DIAGNOSIS — I45.10 UNSPECIFIED RIGHT BUNDLE-BRANCH BLOCK: ICD-10-CM

## 2024-02-23 DIAGNOSIS — Z82.49 FAMILY HISTORY OF ISCHEMIC HEART DISEASE AND OTHER DISEASES OF THE CIRCULATORY SYSTEM: ICD-10-CM

## 2024-02-23 DIAGNOSIS — R07.89 OTHER CHEST PAIN: ICD-10-CM

## 2024-02-23 DIAGNOSIS — F43.10 POST-TRAUMATIC STRESS DISORDER, UNSPECIFIED: ICD-10-CM

## 2024-02-23 DIAGNOSIS — F41.9 ANXIETY DISORDER, UNSPECIFIED: ICD-10-CM

## 2024-02-23 DIAGNOSIS — F17.210 NICOTINE DEPENDENCE, CIGARETTES, UNCOMPLICATED: ICD-10-CM

## 2024-02-23 DIAGNOSIS — J44.9 CHRONIC OBSTRUCTIVE PULMONARY DISEASE, UNSPECIFIED: ICD-10-CM

## 2024-02-23 DIAGNOSIS — Z88.0 ALLERGY STATUS TO PENICILLIN: ICD-10-CM

## 2024-02-23 LAB
ALBUMIN SERPL ELPH-MCNC: 4.4 G/DL — SIGNIFICANT CHANGE UP (ref 3.5–5.2)
ALP SERPL-CCNC: 83 U/L — SIGNIFICANT CHANGE UP (ref 30–115)
ALT FLD-CCNC: 19 U/L — SIGNIFICANT CHANGE UP (ref 0–41)
ANION GAP SERPL CALC-SCNC: 14 MMOL/L — SIGNIFICANT CHANGE UP (ref 7–14)
AST SERPL-CCNC: 25 U/L — SIGNIFICANT CHANGE UP (ref 0–41)
BASOPHILS # BLD AUTO: 0.07 K/UL — SIGNIFICANT CHANGE UP (ref 0–0.2)
BASOPHILS NFR BLD AUTO: 0.5 % — SIGNIFICANT CHANGE UP (ref 0–1)
BILIRUB SERPL-MCNC: 0.4 MG/DL — SIGNIFICANT CHANGE UP (ref 0.2–1.2)
BUN SERPL-MCNC: 16 MG/DL — SIGNIFICANT CHANGE UP (ref 10–20)
CALCIUM SERPL-MCNC: 9.6 MG/DL — SIGNIFICANT CHANGE UP (ref 8.4–10.5)
CHLORIDE SERPL-SCNC: 99 MMOL/L — SIGNIFICANT CHANGE UP (ref 98–110)
CO2 SERPL-SCNC: 24 MMOL/L — SIGNIFICANT CHANGE UP (ref 17–32)
CREAT SERPL-MCNC: 1 MG/DL — SIGNIFICANT CHANGE UP (ref 0.7–1.5)
EGFR: 89 ML/MIN/1.73M2 — SIGNIFICANT CHANGE UP
EOSINOPHIL # BLD AUTO: 0.18 K/UL — SIGNIFICANT CHANGE UP (ref 0–0.7)
EOSINOPHIL NFR BLD AUTO: 1.2 % — SIGNIFICANT CHANGE UP (ref 0–8)
GLUCOSE SERPL-MCNC: 89 MG/DL — SIGNIFICANT CHANGE UP (ref 70–99)
HCT VFR BLD CALC: 51.4 % — SIGNIFICANT CHANGE UP (ref 42–52)
HGB BLD-MCNC: 18 G/DL — SIGNIFICANT CHANGE UP (ref 14–18)
IMM GRANULOCYTES NFR BLD AUTO: 0.3 % — SIGNIFICANT CHANGE UP (ref 0.1–0.3)
LIDOCAIN IGE QN: 26 U/L — SIGNIFICANT CHANGE UP (ref 7–60)
LYMPHOCYTES # BLD AUTO: 14.9 % — LOW (ref 20.5–51.1)
LYMPHOCYTES # BLD AUTO: 2.26 K/UL — SIGNIFICANT CHANGE UP (ref 1.2–3.4)
MAGNESIUM SERPL-MCNC: 1.8 MG/DL — SIGNIFICANT CHANGE UP (ref 1.8–2.4)
MCHC RBC-ENTMCNC: 31.6 PG — HIGH (ref 27–31)
MCHC RBC-ENTMCNC: 35 G/DL — SIGNIFICANT CHANGE UP (ref 32–37)
MCV RBC AUTO: 90.3 FL — SIGNIFICANT CHANGE UP (ref 80–94)
MONOCYTES # BLD AUTO: 0.86 K/UL — HIGH (ref 0.1–0.6)
MONOCYTES NFR BLD AUTO: 5.7 % — SIGNIFICANT CHANGE UP (ref 1.7–9.3)
NEUTROPHILS # BLD AUTO: 11.78 K/UL — HIGH (ref 1.4–6.5)
NEUTROPHILS NFR BLD AUTO: 77.4 % — HIGH (ref 42.2–75.2)
NRBC # BLD: 0 /100 WBCS — SIGNIFICANT CHANGE UP (ref 0–0)
PLATELET # BLD AUTO: 238 K/UL — SIGNIFICANT CHANGE UP (ref 130–400)
PMV BLD: 9.9 FL — SIGNIFICANT CHANGE UP (ref 7.4–10.4)
POTASSIUM SERPL-MCNC: 4.8 MMOL/L — SIGNIFICANT CHANGE UP (ref 3.5–5)
POTASSIUM SERPL-SCNC: 4.8 MMOL/L — SIGNIFICANT CHANGE UP (ref 3.5–5)
PROT SERPL-MCNC: 7.7 G/DL — SIGNIFICANT CHANGE UP (ref 6–8)
RBC # BLD: 5.69 M/UL — SIGNIFICANT CHANGE UP (ref 4.7–6.1)
RBC # FLD: 13.7 % — SIGNIFICANT CHANGE UP (ref 11.5–14.5)
SODIUM SERPL-SCNC: 137 MMOL/L — SIGNIFICANT CHANGE UP (ref 135–146)
TROPONIN T, HIGH SENSITIVITY RESULT: 8 NG/L — SIGNIFICANT CHANGE UP (ref 6–21)
TROPONIN T, HIGH SENSITIVITY RESULT: 9 NG/L — SIGNIFICANT CHANGE UP (ref 6–21)
WBC # BLD: 15.2 K/UL — HIGH (ref 4.8–10.8)
WBC # FLD AUTO: 15.2 K/UL — HIGH (ref 4.8–10.8)

## 2024-02-23 PROCEDURE — 83735 ASSAY OF MAGNESIUM: CPT

## 2024-02-23 PROCEDURE — 85025 COMPLETE CBC W/AUTO DIFF WBC: CPT

## 2024-02-23 PROCEDURE — 80053 COMPREHEN METABOLIC PANEL: CPT

## 2024-02-23 PROCEDURE — 93005 ELECTROCARDIOGRAM TRACING: CPT

## 2024-02-23 PROCEDURE — 71046 X-RAY EXAM CHEST 2 VIEWS: CPT

## 2024-02-23 PROCEDURE — 71046 X-RAY EXAM CHEST 2 VIEWS: CPT | Mod: 26

## 2024-02-23 PROCEDURE — 84484 ASSAY OF TROPONIN QUANT: CPT

## 2024-02-23 PROCEDURE — 93010 ELECTROCARDIOGRAM REPORT: CPT

## 2024-02-23 PROCEDURE — 99285 EMERGENCY DEPT VISIT HI MDM: CPT

## 2024-02-23 PROCEDURE — 83690 ASSAY OF LIPASE: CPT

## 2024-02-23 PROCEDURE — 99285 EMERGENCY DEPT VISIT HI MDM: CPT | Mod: 25

## 2024-02-23 PROCEDURE — 36415 COLL VENOUS BLD VENIPUNCTURE: CPT

## 2024-02-23 PROCEDURE — 93010 ELECTROCARDIOGRAM REPORT: CPT | Mod: 77

## 2024-02-23 RX ORDER — ACETAMINOPHEN 500 MG
650 TABLET ORAL ONCE
Refills: 0 | Status: COMPLETED | OUTPATIENT
Start: 2024-02-23 | End: 2024-02-23

## 2024-02-23 RX ORDER — IBUPROFEN 200 MG
600 TABLET ORAL ONCE
Refills: 0 | Status: COMPLETED | OUTPATIENT
Start: 2024-02-23 | End: 2024-02-23

## 2024-02-23 RX ADMIN — Medication 325 MILLIGRAM(S): at 12:34

## 2024-02-23 RX ADMIN — Medication 600 MILLIGRAM(S): at 12:34

## 2024-02-23 NOTE — ED PROVIDER NOTE - OBJECTIVE STATEMENT
53-year-old male with PMH of HLD, COPD not on home O2, IVIS on CPAP nightly, PTSD, anxiety, presents ED for evaluation of chest pain x 1 hour.  Patient states he was in his usual state of health today when he was walking in [employer] and felt sudden onset chest pain.  Described as midsternal chest pressure, constant, nonradiating, worse with movement, without other modifying or relieving factors, without associated symptoms.  Denies fever/chills, cough/congestion, SOB, abdominal pain, N/V/D, urinary symptoms, lower extremity swelling or pain.  Reports he had CT chest done with his pulmonologist 3 weeks ago which showed CAD, saw cardiologist Dr. Noble for the first time last week and has ECHO, labs, CCTA scheduled in the next few weeks which he has not had done yet.     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  Reports + family history of cardiac disease, mom  of MI in her 60s.

## 2024-02-23 NOTE — ED PROVIDER NOTE - NSICDXPASTMEDICALHX_GEN_ALL_CORE_FT
PAST MEDICAL HISTORY:  Anxiety     COPD exacerbation     HLD (hyperlipidemia)     IVIS on CPAP     PTSD (post-traumatic stress disorder)

## 2024-02-23 NOTE — ED ADULT NURSE NOTE - NSFALLUNIVINTERV_ED_ALL_ED
Bed/Stretcher in lowest position, wheels locked, appropriate side rails in place/Call bell, personal items and telephone in reach/Instruct patient to call for assistance before getting out of bed/chair/stretcher/Non-slip footwear applied when patient is off stretcher/[place] to call system/Physically safe environment - no spills, clutter or unnecessary equipment/Purposeful proactive rounding/Room/bathroom lighting operational, light cord in reach

## 2024-02-23 NOTE — ED PROVIDER NOTE - PHYSICAL EXAMINATION
VITAL SIGNS: I have reviewed nursing notes and confirm.  CONSTITUTIONAL: Well-developed; well-nourished; in no acute distress.  SKIN: Skin exam is warm and dry, no acute rash.  HEAD: Normocephalic; atraumatic.  EYES: PERRL, conjunctiva and sclera clear.  ENT: mmm  CARD: S1, S2 normal; no murmurs, gallops, or rubs. Regular rate and rhythm.  RESP: Normal respiratory effort, no tachypnea or distress. Lungs CTAB, no wheezes, rales or rhonchi.  ABD: soft, NT/ND.  EXT: Normal ROM. No clubbing, cyanosis or edema.  NEURO: Alert, oriented. Grossly unremarkable. No focal deficits.  PSYCH: Cooperative, appropriate.

## 2024-02-23 NOTE — ED ADULT NURSE REASSESSMENT NOTE - NS ED NURSE REASSESS COMMENT FT1
Pt reassessed report felling much better breathing better after pain medication is given pt is seen evaluate by ED attending clear to go home NAD noted ready to go home with family members I

## 2024-02-23 NOTE — ED PROVIDER NOTE - PATIENT PORTAL LINK FT
You can access the FollowMyHealth Patient Portal offered by [facility] by registering at the following website: [email or web address]. By joining [employer]’s FollowMyHealth portal, you will also be able to view your health information using other applications (apps) compatible with our system.

## 2024-02-23 NOTE — ED PROVIDER NOTE - PROGRESS NOTE DETAILS
KAMILA: troponins 9 then 8, pt still feels slightly symptomatic.  Does not want to stay in ED OU for CCTA in the morning because he does not want to stay in the ED overnight and is concerned that his insurance has not kicked in yet until 3/1 and wants to do it after the, advised patient to call his cardiologist to help make an appointment for CCTA ASAP.  He has an appointment for echo and labs soon.  Will also give rapid referral for cardiology to see if they can make CCTA sooner.  Supportive care and strict return precautions advised.      I had extensive discussion of risks and benefits of pursuing further medical evaluation and/or care with patient and any available family/friends; patient still electing to leave against medical advice. Patient is awake, alert, oriented and demonstrates full capacity and insight into illness. Patient aware and encouraged to return immediately to ED or nearest ED if patient decides to change mind regarding care or if patient experiences any new, worsening, or concerning symptoms.

## 2024-02-23 NOTE — ED PROVIDER NOTE - CLINICAL SUMMARY MEDICAL DECISION MAKING FREE TEXT BOX
Shawna Schafer)  EndocrinologyMetabDiabetes  [address] [place] [id number]  Phone: [phone]  Fax: [phone]  Follow Up Time:     Cm Ash)  Neurology  Epilepsy  [address] [place] [address], [facility] [address]  [place] [address]  Phone: [phone]  Fax: ()-  Follow Up Time:    No distress.  VSS.  Non toxic.  EKG non ischemic.  Negative delta high sensitivity troponin.  Offered EDOU stay but prefers to continue with his current outpatient scheduled care.  Strict return instructions discussed.

## 2024-02-23 NOTE — ED PROVIDER NOTE - PROVIDER TOKENS
Addended by: AYDIN HUDSON on: 10/31/2022 09:33 AM     Modules accepted: Orders     PROVIDER:[TOKEN:[[id number]],FOLLOWUP:[1-3 Days],ESTABLISHEDPATIENT:[T]]

## 2024-02-23 NOTE — ED PROVIDER NOTE - NS_EDPROVIDERDISPOUSERTYPE_ED_A_ED
Quality 128: Preventive Care And Screening: Body Mass Index (Bmi) Screening And Follow-Up Plan: BMI is documented within normal parameters and no follow-up plan is required.
Quality 431: Preventive Care And Screening: Unhealthy Alcohol Use - Screening: Patient screened for unhealthy alcohol use using a single question and scores less than 2 times per year
Detail Level: Detailed
Attending Attestation (For Attendings USE Only)...
Quality 130: Documentation Of Current Medications In The Medical Record: Current Medications Documented
Quality 226: Preventive Care And Screening: Tobacco Use: Screening And Cessation Intervention: Patient screened for tobacco and never smoked
Quality 111:Pneumonia Vaccination Status For Older Adults: Pneumococcal Vaccination Previously Received

## 2024-02-23 NOTE — ED PROVIDER NOTE - CARE PROVIDER_API CALL
Benjy Goncalves.  Cardiology  [address] [place] [id number]  Phone: [phone]  Fax: [phone]  Established Patient  Follow Up Time: 1-3 Days

## 2024-02-23 NOTE — ED ADULT NURSE NOTE - OBJECTIVE STATEMENT
Pt with C/O chest pain tightness on going for  2 hours  middle sternal pain  pain level #8 in scale 0-10.,pt denies   coughing fever chills N/V/d .

## 2024-03-08 ENCOUNTER — OUTPATIENT (OUTPATIENT)
Dept: OUTPATIENT SERVICES | Facility: HOSPITAL | Age: 54
LOS: 1 days | End: 2024-03-08
Payer: COMMERCIAL

## 2024-03-08 ENCOUNTER — APPOINTMENT (OUTPATIENT)
Dept: PSYCHIATRY | Facility: CLINIC | Age: 54
End: 2024-03-08

## 2024-03-08 DIAGNOSIS — F43.10 POST-TRAUMATIC STRESS DISORDER, UNSPECIFIED: ICD-10-CM

## 2024-03-08 PROBLEM — E78.5 HYPERLIPIDEMIA, UNSPECIFIED: Chronic | Status: ACTIVE | Noted: 2024-02-23

## 2024-03-08 PROBLEM — F41.9 ANXIETY DISORDER, UNSPECIFIED: Chronic | Status: ACTIVE | Noted: 2024-02-23

## 2024-03-08 PROBLEM — G47.33 OBSTRUCTIVE SLEEP APNEA (ADULT) (PEDIATRIC): Chronic | Status: ACTIVE | Noted: 2024-02-23

## 2024-03-08 PROCEDURE — 90832 PSYTX W PT 30 MINUTES: CPT

## 2024-03-08 NOTE — REASON FOR VISIT
[FreeTextEntry4] : 10am  [FreeTextEntry5] : 10:30am  [Patient] : Patient [FreeTextEntry1] : psychotherapy follow up

## 2024-03-08 NOTE — END OF VISIT
[Duration of Psychotherapy Visit (minutes spent in synchronous communication): ____] : Duration of Psychotherapy Visit (minutes spent in synchronous communication): [unfilled] [Individual Psychotherapy for 16-37 minutes] : Individual Psychotherapy for 16-37 minutes [FreeTextEntry3] : [address]  [FreeTextEntry5] : [address]  [Licensed Clinician] : Licensed Clinician

## 2024-03-08 NOTE — PLAN
[FreeTextEntry2] : Goal #1- Management of PTSD related symptoms  Obj #1- The patient will verbalize three triggers of PTSD symptoms and will utilize three healthy coping mechanisms in the form of utilizing mindfulness activities in three instances of an increase in symptoms Obj #2- The patient will attend scheduled medication management appointments and will adhere to psychotropic medication as prescribed  Goal #2- Health maintenance Obj #1- The patient will attend scheduled medical appointments and will adhere to prescribed medications [Cognitive and/or Behavior Therapy] : Cognitive and/or Behavior Therapy  [Dialectical Behavior Therapy] : Dialectical Behavior Therapy  [Psychoeducation] : Psychoeducation  [Skills training (all types)] : Skills training (all types)  [Supportive Therapy] : Supportive Therapy [Trauma Focused CBT] : Trauma Focused CBT [de-identified] : The patient attended his scheduled session with the writer in person. The patient explained that prior to attending her previous scheduled session with the writer, the patient noted that he was having chest pains, and he went to the ED. The patient explained that it felt as if someone was "squeezing" his heart muscles. The patient explained that it was recommended by medical personnel that he stay overnight for further evaluation. Patient noted that due to not having insurance, he left AMA. Patient is scheduled for a follow up appointment with his cardiologist on 3/20. Patient now has [place] sponsored [facility] Medicare, and he intends on looking for a PCP for medical care and follow up.   The patient reported multiple stressors related to him and his family's health. The patient disclosed how the scenarios are anxiety provoking however, he reports that he is coping well with anxiety. The patient explained that he has been cutting back on his cigarette smoking and notes that is smoking approximately one cigarette per night as opposed to 5. The writer commended the patient. This led to a discussion surrounding adverse health outcomes as a result of smoking. The patient reports medication adherence. The writer will continue to provide the patient with support and encouragement where needed. The patient is scheduled for a follow up appointment on 3/22/2024 at 11am in person per the patient's request.  [Recommended Frequency of Visits: ____] : Recommended frequency of visits: [unfilled] [Return in ____ week(s)] : Return in [unfilled] week(s)

## 2024-03-09 DIAGNOSIS — F43.10 POST-TRAUMATIC STRESS DISORDER, UNSPECIFIED: ICD-10-CM

## 2024-03-09 PROBLEM — J44.1 CHRONIC OBSTRUCTIVE PULMONARY DISEASE WITH (ACUTE) EXACERBATION: Chronic | Status: ACTIVE | Noted: 2024-02-23

## 2024-03-22 ENCOUNTER — APPOINTMENT (OUTPATIENT)
Dept: PSYCHIATRY | Facility: CLINIC | Age: 54
End: 2024-03-22

## 2024-03-28 ENCOUNTER — APPOINTMENT (OUTPATIENT)
Dept: PSYCHIATRY | Facility: CLINIC | Age: 54
End: 2024-03-28

## 2024-04-04 ENCOUNTER — APPOINTMENT (OUTPATIENT)
Dept: PSYCHIATRY | Facility: CLINIC | Age: 54
End: 2024-04-04
Payer: COMMERCIAL

## 2024-04-04 ENCOUNTER — OUTPATIENT (OUTPATIENT)
Dept: OUTPATIENT SERVICES | Facility: HOSPITAL | Age: 54
LOS: 1 days | End: 2024-04-04
Payer: COMMERCIAL

## 2024-04-04 DIAGNOSIS — F43.10 POST-TRAUMATIC STRESS DISORDER, UNSPECIFIED: ICD-10-CM

## 2024-04-04 PROCEDURE — 99213 OFFICE O/P EST LOW 20 MIN: CPT | Mod: 95

## 2024-04-04 RX ORDER — SERTRALINE HYDROCHLORIDE 50 MG/1
50 TABLET, FILM COATED ORAL
Qty: 45 | Refills: 1 | Status: ACTIVE | COMMUNITY
Start: 2023-09-05 | End: 1900-01-01

## 2024-04-04 RX ORDER — TRAZODONE HYDROCHLORIDE 100 MG/1
100 TABLET ORAL
Qty: 30 | Refills: 1 | Status: ACTIVE | COMMUNITY
Start: 2023-10-03 | End: 1900-01-01

## 2024-04-04 NOTE — HISTORY OF PRESENT ILLNESS
[FreeTextEntry1] : Pt seen and evaluated. Pt reports he was started on metoprolol by his cardiologist. Pt reports some situational anxiety due to his recent cardiac issues as well as his daughter's cancer dx. Pt reports ongoing middle insomnia. Pt uses Trazodone nightly. Pt wakes up 2 to 3x a night and is awake for 15 minutes at a time on average. Pt does not use his phone during those nights. Pt reports getting 5 to 7 hours of sleep. Pt states he starts his day at 4am and feels tired by 3pm. Pt does not nap during the day. Pt reports Zoloft is helpful for his PTSD symptoms and anxiety. Pt reports good appetite, denies any active or passive Si/HI/AVH at this time. Pt likes to exercise, lifting weights. Pt reports ongoing benefit from therapy.

## 2024-04-04 NOTE — PHYSICAL EXAM
[Full] : full [Clear] : clear [Linear/Goal Directed] : linear/goal directed [Average] : average [WNL] : within normal limits [FreeTextEntry8] : neutral

## 2024-04-04 NOTE — PLAN
[FreeTextEntry5] : Will continue current med regimen due to ongoing benefit. Pt was offered increase in Trazodone due to ongoing middle insomnia however pt declined, pt agreed to inform provider if sleep issues persist or worsen.   PLAN 1) Continue Zoloft 75mg qd for PTSD 2) Continue Trazodone  100mg qhs PRN for insomnia 3) follow up in 2 month 4) Continue therapy

## 2024-04-05 DIAGNOSIS — F43.10 POST-TRAUMATIC STRESS DISORDER, UNSPECIFIED: ICD-10-CM

## 2024-04-11 ENCOUNTER — NON-APPOINTMENT (OUTPATIENT)
Age: 54
End: 2024-04-11

## 2024-04-11 NOTE — DISCUSSION/SUMMARY
[FreeTextEntry1] : The writer called the patient to inquire about scheduling a follow up appointment. The patient has been scheduled for a follow up appointment on 4/12/2024 at 9:30am.

## 2024-04-12 ENCOUNTER — OUTPATIENT (OUTPATIENT)
Dept: OUTPATIENT SERVICES | Facility: HOSPITAL | Age: 54
LOS: 1 days | End: 2024-04-12
Payer: COMMERCIAL

## 2024-04-12 ENCOUNTER — APPOINTMENT (OUTPATIENT)
Dept: PSYCHIATRY | Facility: CLINIC | Age: 54
End: 2024-04-12

## 2024-04-12 DIAGNOSIS — F43.10 POST-TRAUMATIC STRESS DISORDER, UNSPECIFIED: ICD-10-CM

## 2024-04-12 PROCEDURE — 90834 PSYTX W PT 45 MINUTES: CPT

## 2024-04-12 NOTE — PLAN
[FreeTextEntry2] : Goal #1- Management of PTSD related symptoms  Obj #1- The patient will verbalize three triggers of PTSD symptoms and will utilize three healthy coping mechanisms in the form of utilizing mindfulness activities in three instances of an increase in symptoms Obj #2- The patient will attend scheduled medication management appointments and will adhere to psychotropic medication as prescribed  Goal #2- Health maintenance Obj #1- The patient will attend scheduled medical appointments and will adhere to prescribed medications [Cognitive and/or Behavior Therapy] : Cognitive and/or Behavior Therapy  [Dialectical Behavior Therapy] : Dialectical Behavior Therapy  [Psychoeducation] : Psychoeducation  [Skills training (all types)] : Skills training (all types)  [Supportive Therapy] : Supportive Therapy [Trauma Focused CBT] : Trauma Focused CBT [de-identified] : The patient attended his scheduled session with the writer in person. The patient noted that he has his "good days and bad days". Patient explained that when he is having a bad day, he will try to turn it into a good day. The patient explained that he feels as if his physical health is declining and when he wakes up in the morning, he seldom feels optimal. The patient noted that he has had abnormal bloodwork for many years and despite him going to an oncologist, not much progress has been made to find the root cause of the abnormalities. The writer and the patient discussed extensively about advocating for his medical needs along with the discussion surrounding a referral to specialty providers who would be able to provide further testing including a hematologist and or an infectious disease specialist. Ongoing behavioral activation is encouraged. The patient reports medication adherence. The writer will continue to provide the patient with support and encouragement where needed. The patient is scheduled for a follow up appointment on 4/26/2024 at 10am in person per the patient's request.  [Recommended Frequency of Visits: ____] : Recommended frequency of visits: [unfilled] [Return in ____ week(s)] : Return in [unfilled] week(s)

## 2024-04-12 NOTE — END OF VISIT
[Duration of Psychotherapy Visit (minutes spent in synchronous communication): ____] : Duration of Psychotherapy Visit (minutes spent in synchronous communication): [unfilled] [Individual Psychotherapy for 38-52 minutes] : Individual Psychotherapy for 38 - 52 minutes [FreeTextEntry3] : [address] [FreeTextEntry5] : [address] [Licensed Clinician] : Licensed Clinician

## 2024-04-12 NOTE — REASON FOR VISIT
[FreeTextEntry4] : 9:30am  [FreeTextEntry5] : 10:15am  [Patient] : Patient [FreeTextEntry1] : psychotherapy follow up

## 2024-04-13 DIAGNOSIS — F43.10 POST-TRAUMATIC STRESS DISORDER, UNSPECIFIED: ICD-10-CM

## 2024-04-26 ENCOUNTER — OUTPATIENT (OUTPATIENT)
Dept: OUTPATIENT SERVICES | Facility: HOSPITAL | Age: 54
LOS: 1 days | End: 2024-04-26
Payer: COMMERCIAL

## 2024-04-26 ENCOUNTER — APPOINTMENT (OUTPATIENT)
Dept: PSYCHIATRY | Facility: CLINIC | Age: 54
End: 2024-04-26

## 2024-04-26 DIAGNOSIS — F43.10 POST-TRAUMATIC STRESS DISORDER, UNSPECIFIED: ICD-10-CM

## 2024-04-26 PROCEDURE — 90832 PSYTX W PT 30 MINUTES: CPT

## 2024-04-26 NOTE — END OF VISIT
[Duration of Psychotherapy Visit (minutes spent in synchronous communication): ____] : Duration of Psychotherapy Visit (minutes spent in synchronous communication): [unfilled] [Individual Psychotherapy for 16-37 minutes] : Individual Psychotherapy for 16-37 minutes [FreeTextEntry3] : [address]  [FreeTextEntry5] : [address]  [Licensed Clinician] : Licensed Clinician

## 2024-04-26 NOTE — REASON FOR VISIT
[Patient] : Patient [FreeTextEntry4] : 10am  [FreeTextEntry5] : 10:30am  [FreeTextEntry1] : psychotherapy follow up

## 2024-04-26 NOTE — PLAN
[Cognitive and/or Behavior Therapy] : Cognitive and/or Behavior Therapy  [Dialectical Behavior Therapy] : Dialectical Behavior Therapy  [Psychoeducation] : Psychoeducation  [Skills training (all types)] : Skills training (all types)  [Supportive Therapy] : Supportive Therapy [Trauma Focused CBT] : Trauma Focused CBT [FreeTextEntry2] : Goal #1- Management of PTSD related symptoms  Obj #1- The patient will verbalize three triggers of PTSD symptoms and will utilize three healthy coping mechanisms in the form of utilizing mindfulness activities in three instances of an increase in symptoms Obj #2- The patient will attend scheduled medication management appointments and will adhere to psychotropic medication as prescribed  Goal #2- Health maintenance Obj #1- The patient will attend scheduled medical appointments and will adhere to prescribed medications [de-identified] : The patient attended his scheduled session with the writer in person. The patient noted that he has been feeling more "stress" than usual. The patient noted that he has financial stressors along with medical related stressors. The patient noted that he continues to have daily chronic headaches that has become part of his everyday life. However, the patient intends on addressing these concerns along with his ongoing abnormal bloodwork with his [facility] providers. Patient explained that he continues to engage in behavioral activation in the form of outdoor activities. The patient did not mention any PTSD like symptoms. The patient reports medication adherence. The writer will continue to provide the patient with support and encouragement where needed. The patient is scheduled for a follow up appointment on 5/17/2024 at 10am via telehealth per the patient's request.  [Recommended Frequency of Visits: ____] : Recommended frequency of visits: [unfilled] [Return in ____ week(s)] : Return in [unfilled] week(s)

## 2024-04-27 DIAGNOSIS — F43.10 POST-TRAUMATIC STRESS DISORDER, UNSPECIFIED: ICD-10-CM

## 2024-05-17 ENCOUNTER — APPOINTMENT (OUTPATIENT)
Dept: PSYCHIATRY | Facility: CLINIC | Age: 54
End: 2024-05-17

## 2024-05-22 ENCOUNTER — NON-APPOINTMENT (OUTPATIENT)
Age: 54
End: 2024-05-22

## 2024-05-23 NOTE — DISCUSSION/SUMMARY
[FreeTextEntry1] : The writer called the patient to inquire about resuming therapy however, "a voicemail has not been set up". The writer will continue to outreach the patient.

## 2024-05-30 ENCOUNTER — APPOINTMENT (OUTPATIENT)
Dept: PSYCHIATRY | Facility: CLINIC | Age: 54
End: 2024-05-30

## 2024-06-13 ENCOUNTER — NON-APPOINTMENT (OUTPATIENT)
Age: 54
End: 2024-06-13

## 2024-06-13 NOTE — DISCUSSION/SUMMARY
[FreeTextEntry1] : The writer called the patient to inquire about resuming services at the clinic. Patient scheduled for a follow up appointment on 7/3/24 at 11am in person

## 2024-07-03 ENCOUNTER — APPOINTMENT (OUTPATIENT)
Dept: PSYCHIATRY | Facility: CLINIC | Age: 54
End: 2024-07-03

## 2024-08-14 ENCOUNTER — NON-APPOINTMENT (OUTPATIENT)
Age: 54
End: 2024-08-14

## 2024-08-14 NOTE — DISCUSSION/SUMMARY
[FreeTextEntry1] : The writer called the patient to gauge interest in returning to the clinic for services. The patient is scheduled for a follow up appointment on 08/28/2024 at 11am in person per the patient's request.

## 2024-08-28 ENCOUNTER — APPOINTMENT (OUTPATIENT)
Dept: PSYCHIATRY | Facility: CLINIC | Age: 54
End: 2024-08-28

## 2024-09-05 ENCOUNTER — NON-APPOINTMENT (OUTPATIENT)
Age: 54
End: 2024-09-05

## 2024-09-05 NOTE — DISCUSSION/SUMMARY
[FreeTextEntry1] : The patient is scheduled for a follow up appointment on 9/20/2024 at 9:30am in person per the patient's request.

## 2024-09-20 ENCOUNTER — OUTPATIENT (OUTPATIENT)
Dept: OUTPATIENT SERVICES | Facility: HOSPITAL | Age: 54
LOS: 1 days | End: 2024-09-20
Payer: COMMERCIAL

## 2024-09-20 ENCOUNTER — APPOINTMENT (OUTPATIENT)
Dept: PSYCHIATRY | Facility: CLINIC | Age: 54
End: 2024-09-20

## 2024-09-20 DIAGNOSIS — F43.10 POST-TRAUMATIC STRESS DISORDER, UNSPECIFIED: ICD-10-CM

## 2024-09-20 PROCEDURE — 90832 PSYTX W PT 30 MINUTES: CPT

## 2024-09-20 NOTE — REASON FOR VISIT
[FreeTextEntry4] : 9:30am  [FreeTextEntry5] : 10am  [Patient] : Patient [FreeTextEntry1] : psychotherapy follow up

## 2024-09-20 NOTE — PLAN
[FreeTextEntry2] : Goal #1- Management of PTSD related symptoms  Obj #1- The patient will verbalize three triggers of PTSD symptoms and will utilize three healthy coping mechanisms in the form of utilizing mindfulness activities in three instances of an increase in symptoms Obj #2- The patient will attend scheduled medication management appointments and will adhere to psychotropic medication as prescribed  Goal #2- Health maintenance Obj #1- The patient will attend scheduled medical appointments and will adhere to prescribed medications [Cognitive and/or Behavior Therapy] : Cognitive and/or Behavior Therapy  [Dialectical Behavior Therapy] : Dialectical Behavior Therapy  [Psychoeducation] : Psychoeducation  [Skills training (all types)] : Skills training (all types)  [Supportive Therapy] : Supportive Therapy [Trauma Focused CBT] : Trauma Focused CBT [de-identified] : The patient attended his scheduled session with the writer in person. This was the patient's first appointment since April 2024. The patient noted that he has been having conflicting [facility] appointments and other medical obligations. The patient explained that he has been told that he has been more irritable as of late, however. he noted that he has not been able to pinpoint on which occasions that he has been "snappy". The patient explained that he often prefers to stay by himself as he often feels like a "scapegoat" for other people and their issues which continues to feelings of anxiety. The writer actively listened to the patient, validated his feelings and support was rendered. The patient noted that he continues to have sleep difficulty, and he has not been able to get consistent sleep. Patient noted that he continues to be in limbo regarding his CPAP machine from [facility]. However, he will bring this up during his next yearly [facility] monitoring appointment in the coming weeks.  The patient continues to engage in behavioral activation by completing activities around his home. The writer will continue to provide the patient with support and encouragement where needed. The patient is scheduled for a follow up appointment on 10/4/2024 at 10am in person per the patient's request.  [Recommended Frequency of Visits: ____] : Recommended frequency of visits: [unfilled] [Return in ____ week(s)] : Return in [unfilled] week(s)

## 2024-09-21 DIAGNOSIS — F43.10 POST-TRAUMATIC STRESS DISORDER, UNSPECIFIED: ICD-10-CM

## 2024-10-04 ENCOUNTER — OUTPATIENT (OUTPATIENT)
Dept: OUTPATIENT SERVICES | Facility: HOSPITAL | Age: 54
LOS: 1 days | End: 2024-10-04
Payer: COMMERCIAL

## 2024-10-04 ENCOUNTER — APPOINTMENT (OUTPATIENT)
Dept: PSYCHIATRY | Facility: CLINIC | Age: 54
End: 2024-10-04

## 2024-10-04 DIAGNOSIS — F43.10 POST-TRAUMATIC STRESS DISORDER, UNSPECIFIED: ICD-10-CM

## 2024-10-04 PROCEDURE — 90832 PSYTX W PT 30 MINUTES: CPT

## 2024-10-04 NOTE — END OF VISIT
[Duration of Psychotherapy Visit (minutes spent in synchronous communication): ____] : Duration of Psychotherapy Visit (minutes spent in synchronous communication): [unfilled] [Individual Psychotherapy for 16-37 minutes] : Individual Psychotherapy for 16-37 minutes [FreeTextEntry3] : [address]  [FreeTextEntry5] : [address]  [Licensed Clinician] : Licensed Clinician

## 2024-10-04 NOTE — PLAN
[FreeTextEntry2] : Goal #1- Management of PTSD related symptoms  Obj #1- The patient will verbalize three triggers of PTSD symptoms and will utilize three healthy coping mechanisms in the form of utilizing mindfulness activities in three instances of an increase in symptoms Obj #2- The patient will attend scheduled medication management appointments and will adhere to psychotropic medication as prescribed  Goal #2- Health maintenance Obj #1- The patient will attend scheduled medical appointments and will adhere to prescribed medications [Cognitive and/or Behavior Therapy] : Cognitive and/or Behavior Therapy  [Dialectical Behavior Therapy] : Dialectical Behavior Therapy  [Psychoeducation] : Psychoeducation  [Skills training (all types)] : Skills training (all types)  [Supportive Therapy] : Supportive Therapy [Trauma Focused CBT] : Trauma Focused CBT [de-identified] : The patient attended his scheduled session with the writer in person. The writer completed the patient's MORGAN and PHQ9. The patient noted some health-related stressors that contributes to his anxiety. The patient noted that his medical providers have not addressed his fluctuating blood results. The patient explained that his partner was able to schedule an appointment to see a hematologist and he also will be following up with his PCP. The patient noted that his glucose was significantly high. Ongoing self-advocacy was discussed with the patient. Psychoeducation was provided to the patient on the "American Diabetes Association" regarding A1C. The writer will continue to provide the patient with support and encouragement where needed. The patient is scheduled for a follow up appointment on 10/18/2024 at 9:30am in person per the patient's request. [Recommended Frequency of Visits: ____] : Recommended frequency of visits: [unfilled] [Return in ____ week(s)] : Return in [unfilled] week(s)

## 2024-10-04 NOTE — PLAN
[FreeTextEntry2] : Goal #1- Management of PTSD related symptoms  Obj #1- The patient will verbalize three triggers of PTSD symptoms and will utilize three healthy coping mechanisms in the form of utilizing mindfulness activities in three instances of an increase in symptoms Obj #2- The patient will attend scheduled medication management appointments and will adhere to psychotropic medication as prescribed  Goal #2- Health maintenance Obj #1- The patient will attend scheduled medical appointments and will adhere to prescribed medications [Cognitive and/or Behavior Therapy] : Cognitive and/or Behavior Therapy  [Dialectical Behavior Therapy] : Dialectical Behavior Therapy  [Psychoeducation] : Psychoeducation  [Skills training (all types)] : Skills training (all types)  [Supportive Therapy] : Supportive Therapy [Trauma Focused CBT] : Trauma Focused CBT [de-identified] : The patient attended his scheduled session with the writer in person. The writer completed the patient's MORGAN and PHQ9. The patient noted some health-related stressors that contributes to his anxiety. The patient noted that his medical providers have not addressed his fluctuating blood results. The patient explained that his partner was able to schedule an appointment to see a hematologist and he also will be following up with his PCP. The patient noted that his glucose was significantly high. Ongoing self-advocacy was discussed with the patient. Psychoeducation was provided to the patient on the "American Diabetes Association" regarding A1C. The writer will continue to provide the patient with support and encouragement where needed. The patient is scheduled for a follow up appointment on 10/18/2024 at 9:30am in person per the patient's request. [Recommended Frequency of Visits: ____] : Recommended frequency of visits: [unfilled] [Return in ____ week(s)] : Return in [unfilled] week(s)

## 2024-10-05 DIAGNOSIS — F43.10 POST-TRAUMATIC STRESS DISORDER, UNSPECIFIED: ICD-10-CM

## 2024-10-09 ENCOUNTER — APPOINTMENT (OUTPATIENT)
Dept: PSYCHIATRY | Facility: CLINIC | Age: 54
End: 2024-10-09

## 2024-10-16 ENCOUNTER — APPOINTMENT (OUTPATIENT)
Dept: PSYCHIATRY | Facility: CLINIC | Age: 54
End: 2024-10-16
Payer: COMMERCIAL

## 2024-10-16 ENCOUNTER — OUTPATIENT (OUTPATIENT)
Dept: OUTPATIENT SERVICES | Facility: HOSPITAL | Age: 54
LOS: 1 days | End: 2024-10-16
Payer: COMMERCIAL

## 2024-10-16 DIAGNOSIS — F43.10 POST-TRAUMATIC STRESS DISORDER, UNSPECIFIED: ICD-10-CM

## 2024-10-16 PROCEDURE — 99214 OFFICE O/P EST MOD 30 MIN: CPT | Mod: 95

## 2024-10-17 DIAGNOSIS — F43.10 POST-TRAUMATIC STRESS DISORDER, UNSPECIFIED: ICD-10-CM

## 2024-10-18 ENCOUNTER — OUTPATIENT (OUTPATIENT)
Dept: OUTPATIENT SERVICES | Facility: HOSPITAL | Age: 54
LOS: 1 days | End: 2024-10-18
Payer: COMMERCIAL

## 2024-10-18 ENCOUNTER — APPOINTMENT (OUTPATIENT)
Dept: PSYCHIATRY | Facility: CLINIC | Age: 54
End: 2024-10-18

## 2024-10-18 PROCEDURE — 90832 PSYTX W PT 30 MINUTES: CPT

## 2024-10-19 DIAGNOSIS — F43.10 POST-TRAUMATIC STRESS DISORDER, UNSPECIFIED: ICD-10-CM

## 2024-11-01 ENCOUNTER — APPOINTMENT (OUTPATIENT)
Dept: PSYCHIATRY | Facility: CLINIC | Age: 54
End: 2024-11-01

## 2024-11-01 ENCOUNTER — OUTPATIENT (OUTPATIENT)
Dept: OUTPATIENT SERVICES | Facility: HOSPITAL | Age: 54
LOS: 1 days | End: 2024-11-01
Payer: COMMERCIAL

## 2024-11-01 PROCEDURE — 90832 PSYTX W PT 30 MINUTES: CPT

## 2024-11-02 DIAGNOSIS — F43.10 POST-TRAUMATIC STRESS DISORDER, UNSPECIFIED: ICD-10-CM

## 2024-11-03 NOTE — END OF VISIT
[Duration of Psychotherapy Visit (minutes spent in synchronous communication): ____] : Duration of Psychotherapy Visit (minutes spent in synchronous communication): [unfilled] [Individual Psychotherapy for 16-37 minutes] : Individual Psychotherapy for 16-37 minutes [FreeTextEntry3] : [address]  [FreeTextEntry5] : [address]  [Licensed Clinician] : Licensed Clinician normal performance

## 2024-11-14 ENCOUNTER — APPOINTMENT (OUTPATIENT)
Dept: PSYCHIATRY | Facility: CLINIC | Age: 54
End: 2024-11-14

## 2024-12-05 ENCOUNTER — APPOINTMENT (OUTPATIENT)
Dept: PSYCHIATRY | Facility: CLINIC | Age: 54
End: 2024-12-05

## 2024-12-05 ENCOUNTER — OUTPATIENT (OUTPATIENT)
Dept: OUTPATIENT SERVICES | Facility: HOSPITAL | Age: 54
LOS: 1 days | End: 2024-12-05
Payer: COMMERCIAL

## 2024-12-05 PROCEDURE — 90832 PSYTX W PT 30 MINUTES: CPT

## 2024-12-17 ENCOUNTER — OUTPATIENT (OUTPATIENT)
Dept: OUTPATIENT SERVICES | Facility: HOSPITAL | Age: 54
LOS: 1 days | End: 2024-12-17
Payer: COMMERCIAL

## 2024-12-17 ENCOUNTER — APPOINTMENT (OUTPATIENT)
Dept: PSYCHIATRY | Facility: CLINIC | Age: 54
End: 2024-12-17
Payer: COMMERCIAL

## 2024-12-17 DIAGNOSIS — F43.10 POST-TRAUMATIC STRESS DISORDER, UNSPECIFIED: ICD-10-CM

## 2024-12-17 PROCEDURE — 99213 OFFICE O/P EST LOW 20 MIN: CPT | Mod: 95

## 2024-12-19 ENCOUNTER — OUTPATIENT (OUTPATIENT)
Dept: OUTPATIENT SERVICES | Facility: HOSPITAL | Age: 54
LOS: 1 days | End: 2024-12-19
Payer: COMMERCIAL

## 2024-12-19 ENCOUNTER — APPOINTMENT (OUTPATIENT)
Dept: PSYCHIATRY | Facility: CLINIC | Age: 54
End: 2024-12-19

## 2024-12-19 PROCEDURE — 90832 PSYTX W PT 30 MINUTES: CPT

## 2025-01-10 ENCOUNTER — APPOINTMENT (OUTPATIENT)
Dept: PSYCHIATRY | Facility: CLINIC | Age: 55
End: 2025-01-10

## 2025-01-10 ENCOUNTER — OUTPATIENT (OUTPATIENT)
Dept: OUTPATIENT SERVICES | Facility: HOSPITAL | Age: 55
LOS: 1 days | End: 2025-01-10
Payer: COMMERCIAL

## 2025-01-10 PROCEDURE — 90832 PSYTX W PT 30 MINUTES: CPT

## 2025-01-24 ENCOUNTER — OUTPATIENT (OUTPATIENT)
Dept: OUTPATIENT SERVICES | Facility: HOSPITAL | Age: 55
LOS: 1 days | End: 2025-01-24
Payer: COMMERCIAL

## 2025-01-24 ENCOUNTER — APPOINTMENT (OUTPATIENT)
Dept: PSYCHIATRY | Facility: CLINIC | Age: 55
End: 2025-01-24

## 2025-01-24 PROCEDURE — 90832 PSYTX W PT 30 MINUTES: CPT

## 2025-01-25 DIAGNOSIS — F43.10 POST-TRAUMATIC STRESS DISORDER, UNSPECIFIED: ICD-10-CM

## 2025-02-07 ENCOUNTER — OUTPATIENT (OUTPATIENT)
Dept: OUTPATIENT SERVICES | Facility: HOSPITAL | Age: 55
LOS: 1 days | End: 2025-02-07
Payer: COMMERCIAL

## 2025-02-07 ENCOUNTER — APPOINTMENT (OUTPATIENT)
Dept: PSYCHIATRY | Facility: CLINIC | Age: 55
End: 2025-02-07

## 2025-02-07 DIAGNOSIS — F43.10 POST-TRAUMATIC STRESS DISORDER, UNSPECIFIED: ICD-10-CM

## 2025-02-07 PROCEDURE — 90832 PSYTX W PT 30 MINUTES: CPT

## 2025-02-18 ENCOUNTER — APPOINTMENT (OUTPATIENT)
Dept: PSYCHIATRY | Facility: CLINIC | Age: 55
End: 2025-02-18
Payer: COMMERCIAL

## 2025-02-18 ENCOUNTER — OUTPATIENT (OUTPATIENT)
Dept: OUTPATIENT SERVICES | Facility: HOSPITAL | Age: 55
LOS: 1 days | End: 2025-02-18
Payer: COMMERCIAL

## 2025-02-18 DIAGNOSIS — F43.10 POST-TRAUMATIC STRESS DISORDER, UNSPECIFIED: ICD-10-CM

## 2025-02-18 PROCEDURE — 99213 OFFICE O/P EST LOW 20 MIN: CPT | Mod: 95

## 2025-02-18 PROCEDURE — 98006 SYNCH AUDIO-VIDEO EST MOD 30: CPT

## 2025-02-21 ENCOUNTER — APPOINTMENT (OUTPATIENT)
Dept: PSYCHIATRY | Facility: CLINIC | Age: 55
End: 2025-02-21

## 2025-02-21 ENCOUNTER — OUTPATIENT (OUTPATIENT)
Dept: OUTPATIENT SERVICES | Facility: HOSPITAL | Age: 55
LOS: 1 days | End: 2025-02-21
Payer: COMMERCIAL

## 2025-02-21 DIAGNOSIS — F43.10 POST-TRAUMATIC STRESS DISORDER, UNSPECIFIED: ICD-10-CM

## 2025-02-21 PROCEDURE — 90832 PSYTX W PT 30 MINUTES: CPT

## 2025-03-07 ENCOUNTER — OUTPATIENT (OUTPATIENT)
Dept: OUTPATIENT SERVICES | Facility: HOSPITAL | Age: 55
LOS: 1 days | End: 2025-03-07
Payer: COMMERCIAL

## 2025-03-07 ENCOUNTER — APPOINTMENT (OUTPATIENT)
Dept: PSYCHIATRY | Facility: CLINIC | Age: 55
End: 2025-03-07

## 2025-03-07 DIAGNOSIS — F43.10 POST-TRAUMATIC STRESS DISORDER, UNSPECIFIED: ICD-10-CM

## 2025-03-07 PROCEDURE — 90832 PSYTX W PT 30 MINUTES: CPT

## 2025-03-21 ENCOUNTER — APPOINTMENT (OUTPATIENT)
Dept: PSYCHIATRY | Facility: CLINIC | Age: 55
End: 2025-03-21

## 2025-03-21 ENCOUNTER — OUTPATIENT (OUTPATIENT)
Dept: OUTPATIENT SERVICES | Facility: HOSPITAL | Age: 55
LOS: 1 days | End: 2025-03-21
Payer: COMMERCIAL

## 2025-03-21 PROCEDURE — 90832 PSYTX W PT 30 MINUTES: CPT

## 2025-04-04 ENCOUNTER — APPOINTMENT (OUTPATIENT)
Dept: PSYCHIATRY | Facility: CLINIC | Age: 55
End: 2025-04-04

## 2025-04-22 ENCOUNTER — APPOINTMENT (OUTPATIENT)
Dept: PSYCHIATRY | Facility: CLINIC | Age: 55
End: 2025-04-22
Payer: COMMERCIAL

## 2025-04-22 ENCOUNTER — OUTPATIENT (OUTPATIENT)
Dept: OUTPATIENT SERVICES | Facility: HOSPITAL | Age: 55
LOS: 1 days | End: 2025-04-22
Payer: COMMERCIAL

## 2025-04-22 DIAGNOSIS — F43.10 POST-TRAUMATIC STRESS DISORDER, UNSPECIFIED: ICD-10-CM

## 2025-04-22 PROCEDURE — 99213 OFFICE O/P EST LOW 20 MIN: CPT | Mod: 95

## 2025-04-22 PROCEDURE — 98006 SYNCH AUDIO-VIDEO EST MOD 30: CPT

## 2025-04-24 ENCOUNTER — APPOINTMENT (OUTPATIENT)
Dept: PSYCHIATRY | Facility: CLINIC | Age: 55
End: 2025-04-24

## 2025-04-24 ENCOUNTER — OUTPATIENT (OUTPATIENT)
Dept: OUTPATIENT SERVICES | Facility: HOSPITAL | Age: 55
LOS: 1 days | End: 2025-04-24
Payer: COMMERCIAL

## 2025-04-24 PROCEDURE — 90832 PSYTX W PT 30 MINUTES: CPT

## 2025-04-25 DIAGNOSIS — F43.10 POST-TRAUMATIC STRESS DISORDER, UNSPECIFIED: ICD-10-CM

## 2025-05-01 ENCOUNTER — APPOINTMENT (OUTPATIENT)
Dept: PSYCHIATRY | Facility: CLINIC | Age: 55
End: 2025-05-01

## 2025-05-01 ENCOUNTER — OUTPATIENT (OUTPATIENT)
Dept: OUTPATIENT SERVICES | Facility: HOSPITAL | Age: 55
LOS: 1 days | End: 2025-05-01
Payer: COMMERCIAL

## 2025-05-01 DIAGNOSIS — F43.10 POST-TRAUMATIC STRESS DISORDER, UNSPECIFIED: ICD-10-CM

## 2025-05-01 PROCEDURE — 90832 PSYTX W PT 30 MINUTES: CPT

## 2025-05-15 ENCOUNTER — APPOINTMENT (OUTPATIENT)
Dept: PSYCHIATRY | Facility: CLINIC | Age: 55
End: 2025-05-15

## 2025-05-15 ENCOUNTER — OUTPATIENT (OUTPATIENT)
Dept: OUTPATIENT SERVICES | Facility: HOSPITAL | Age: 55
LOS: 1 days | End: 2025-05-15
Payer: COMMERCIAL

## 2025-05-15 PROCEDURE — 90832 PSYTX W PT 30 MINUTES: CPT

## 2025-05-29 ENCOUNTER — APPOINTMENT (OUTPATIENT)
Dept: PSYCHIATRY | Facility: CLINIC | Age: 55
End: 2025-05-29

## 2025-05-29 ENCOUNTER — OUTPATIENT (OUTPATIENT)
Dept: OUTPATIENT SERVICES | Facility: HOSPITAL | Age: 55
LOS: 1 days | End: 2025-05-29
Payer: COMMERCIAL

## 2025-05-29 PROCEDURE — 90832 PSYTX W PT 30 MINUTES: CPT

## 2025-06-12 ENCOUNTER — APPOINTMENT (OUTPATIENT)
Dept: PSYCHIATRY | Facility: CLINIC | Age: 55
End: 2025-06-12

## 2025-06-17 ENCOUNTER — APPOINTMENT (OUTPATIENT)
Dept: PSYCHIATRY | Facility: CLINIC | Age: 55
End: 2025-06-17
Payer: COMMERCIAL

## 2025-06-17 ENCOUNTER — OUTPATIENT (OUTPATIENT)
Dept: OUTPATIENT SERVICES | Facility: HOSPITAL | Age: 55
LOS: 1 days | End: 2025-06-17
Payer: COMMERCIAL

## 2025-06-17 PROCEDURE — 99213 OFFICE O/P EST LOW 20 MIN: CPT | Mod: 95

## 2025-06-17 PROCEDURE — 98006 SYNCH AUDIO-VIDEO EST MOD 30: CPT

## 2025-06-18 DIAGNOSIS — F43.10 POST-TRAUMATIC STRESS DISORDER, UNSPECIFIED: ICD-10-CM

## 2025-07-17 ENCOUNTER — NON-APPOINTMENT (OUTPATIENT)
Age: 55
End: 2025-07-17

## 2025-07-29 ENCOUNTER — APPOINTMENT (OUTPATIENT)
Dept: PSYCHIATRY | Facility: CLINIC | Age: 55
End: 2025-07-29
Payer: COMMERCIAL

## 2025-07-29 ENCOUNTER — OUTPATIENT (OUTPATIENT)
Dept: OUTPATIENT SERVICES | Facility: HOSPITAL | Age: 55
LOS: 1 days | End: 2025-07-29
Payer: COMMERCIAL

## 2025-07-29 DIAGNOSIS — F43.10 POST-TRAUMATIC STRESS DISORDER, UNSPECIFIED: ICD-10-CM

## 2025-07-29 PROCEDURE — 99213 OFFICE O/P EST LOW 20 MIN: CPT | Mod: 95

## 2025-07-29 PROCEDURE — 98006 SYNCH AUDIO-VIDEO EST MOD 30: CPT
